# Patient Record
Sex: MALE | Race: WHITE | NOT HISPANIC OR LATINO | URBAN - NONMETROPOLITAN AREA
[De-identification: names, ages, dates, MRNs, and addresses within clinical notes are randomized per-mention and may not be internally consistent; named-entity substitution may affect disease eponyms.]

---

## 2017-08-15 ENCOUNTER — HOSPITAL ENCOUNTER (OUTPATIENT)
Dept: RADIOLOGY | Facility: OTHER | Age: 22
End: 2017-08-15

## 2017-08-15 ENCOUNTER — AMBULATORY - GICH (OUTPATIENT)
Dept: RADIOLOGY | Facility: OTHER | Age: 22
End: 2017-08-15

## 2017-08-15 DIAGNOSIS — M25.461 EFFUSION OF RIGHT KNEE: ICD-10-CM

## 2017-08-15 DIAGNOSIS — M25.561 PAIN IN RIGHT KNEE: ICD-10-CM

## 2017-08-16 ENCOUNTER — HISTORY (OUTPATIENT)
Dept: EMERGENCY MEDICINE | Facility: OTHER | Age: 22
End: 2017-08-16

## 2017-08-17 ENCOUNTER — HISTORY (OUTPATIENT)
Dept: MEDSURG UNIT | Facility: OTHER | Age: 22
End: 2017-08-17

## 2017-08-21 ENCOUNTER — HISTORY (OUTPATIENT)
Dept: MEDSURG UNIT | Facility: OTHER | Age: 22
End: 2017-08-21

## 2017-08-23 ENCOUNTER — AMBULATORY - GICH (OUTPATIENT)
Dept: SCHEDULING | Facility: OTHER | Age: 22
End: 2017-08-23

## 2017-08-23 ENCOUNTER — HOSPITAL ENCOUNTER (INPATIENT)
Facility: CLINIC | Age: 22
LOS: 6 days | Discharge: HOME | End: 2017-08-29
Attending: ORTHOPAEDIC SURGERY | Admitting: ORTHOPAEDIC SURGERY
Payer: MEDICAID

## 2017-08-23 DIAGNOSIS — M25.561 ACUTE PAIN OF RIGHT KNEE: Primary | ICD-10-CM

## 2017-08-23 PROBLEM — C41.9 OSTEOSARCOMA (H): Status: ACTIVE | Noted: 2017-08-23

## 2017-08-23 LAB
CRP SERPL-MCNC: 117 MG/L (ref 0–8)
ERYTHROCYTE [SEDIMENTATION RATE] IN BLOOD BY WESTERGREN METHOD: 77 MM/H (ref 0–15)

## 2017-08-23 PROCEDURE — 25000132 ZZH RX MED GY IP 250 OP 250 PS 637: Performed by: STUDENT IN AN ORGANIZED HEALTH CARE EDUCATION/TRAINING PROGRAM

## 2017-08-23 PROCEDURE — 99207 ZZC MOONLIGHTING INDICATOR: CPT | Performed by: INTERNAL MEDICINE

## 2017-08-23 PROCEDURE — 86140 C-REACTIVE PROTEIN: CPT | Performed by: STUDENT IN AN ORGANIZED HEALTH CARE EDUCATION/TRAINING PROGRAM

## 2017-08-23 PROCEDURE — 36415 COLL VENOUS BLD VENIPUNCTURE: CPT | Performed by: STUDENT IN AN ORGANIZED HEALTH CARE EDUCATION/TRAINING PROGRAM

## 2017-08-23 PROCEDURE — 85652 RBC SED RATE AUTOMATED: CPT | Performed by: STUDENT IN AN ORGANIZED HEALTH CARE EDUCATION/TRAINING PROGRAM

## 2017-08-23 PROCEDURE — 12000001 ZZH R&B MED SURG/OB UMMC

## 2017-08-23 RX ORDER — ACETAMINOPHEN 325 MG/1
650 TABLET ORAL EVERY 4 HOURS PRN
Status: DISCONTINUED | OUTPATIENT
Start: 2017-08-23 | End: 2017-08-29 | Stop reason: HOSPADM

## 2017-08-23 RX ORDER — PROCHLORPERAZINE 25 MG
25 SUPPOSITORY, RECTAL RECTAL EVERY 12 HOURS PRN
Status: DISCONTINUED | OUTPATIENT
Start: 2017-08-23 | End: 2017-08-28

## 2017-08-23 RX ORDER — NALOXONE HYDROCHLORIDE 0.4 MG/ML
.1-.4 INJECTION, SOLUTION INTRAMUSCULAR; INTRAVENOUS; SUBCUTANEOUS
Status: DISCONTINUED | OUTPATIENT
Start: 2017-08-23 | End: 2017-08-29 | Stop reason: HOSPADM

## 2017-08-23 RX ORDER — ONDANSETRON 2 MG/ML
4 INJECTION INTRAMUSCULAR; INTRAVENOUS EVERY 6 HOURS PRN
Status: DISCONTINUED | OUTPATIENT
Start: 2017-08-23 | End: 2017-08-28

## 2017-08-23 RX ORDER — AMOXICILLIN 250 MG
1-2 CAPSULE ORAL 2 TIMES DAILY
Status: DISCONTINUED | OUTPATIENT
Start: 2017-08-23 | End: 2017-08-29 | Stop reason: HOSPADM

## 2017-08-23 RX ORDER — ONDANSETRON 4 MG/1
4 TABLET, ORALLY DISINTEGRATING ORAL EVERY 6 HOURS PRN
Status: DISCONTINUED | OUTPATIENT
Start: 2017-08-23 | End: 2017-08-28

## 2017-08-23 RX ORDER — PROCHLORPERAZINE MALEATE 5 MG
5-10 TABLET ORAL EVERY 6 HOURS PRN
Status: DISCONTINUED | OUTPATIENT
Start: 2017-08-23 | End: 2017-08-28

## 2017-08-23 RX ORDER — OXYCODONE HYDROCHLORIDE 5 MG/1
5 TABLET ORAL EVERY 4 HOURS PRN
Status: DISCONTINUED | OUTPATIENT
Start: 2017-08-23 | End: 2017-08-25

## 2017-08-23 RX ADMIN — ACETAMINOPHEN 650 MG: 325 TABLET, FILM COATED ORAL at 20:42

## 2017-08-23 RX ADMIN — OXYCODONE HYDROCHLORIDE 5 MG: 5 TABLET ORAL at 23:45

## 2017-08-23 ASSESSMENT — ACTIVITIES OF DAILY LIVING (ADL)
COGNITION: 0 - NO COGNITION ISSUES REPORTED
BATHING: 0-->INDEPENDENT
WHICH_OF_THE_ABOVE_FUNCTIONAL_RISKS_HAD_A_RECENT_ONSET_OR_CHANGE?: AMBULATION;TRANSFERRING
RETIRED_COMMUNICATION: 0-->UNDERSTANDS/COMMUNICATES WITHOUT DIFFICULTY
TOILETING: 0-->INDEPENDENT
AMBULATION: 0-->INDEPENDENT
TRANSFERRING: 0-->INDEPENDENT
NUMBER_OF_TIMES_PATIENT_HAS_FALLEN_WITHIN_LAST_SIX_MONTHS: 1
SWALLOWING: 0-->SWALLOWS FOODS/LIQUIDS WITHOUT DIFFICULTY
FALL_HISTORY_WITHIN_LAST_SIX_MONTHS: YES
RETIRED_EATING: 0-->INDEPENDENT
DRESS: 0-->INDEPENDENT

## 2017-08-23 NOTE — H&P
[facility], Orthopaedic Surgery Consultation    Ariel Triplett MRN# [id number]   Age: 22 year old YOB: 1995                       Assessment and Plan:   Assessment:  22-year-old male transferred from outside hospital with MRI concerning for right distal femur osteosarcoma versus osteomyelitis    Plan:  At this time he do not think the patient has a septic right knee based on his physical exam findings and therefore we will not aspirate joint at this time.  1. Obtain base lab work including CBC, BMP, CRP, ESR  2. Uploaded MRI and other images for staff to review  3. Weightbearing as tolerated  4. Normal diet with no plans for operating room currently          History of Present Illness:   Patient was seen and examined by me. History, PMH, Meds, SH, complete ROS (10 organ systems) and PE reviewed with patient and prior medical records.      22-year-old male transferred from [facility] to the [facility] with differential diagnosis of osteomyelitis versus osteosarcoma right distal femur.    Patient reported that right knee and right hip pain that started approximately August 8 when walking home from work. He noticed that the pain continued to linger for the next week making him think that he had perhaps pulled a muscle. Then on August 15 the pain was continuing to increase that he went to a free clinic where an x-ray was obtained. The following day he went to the emergency department with uncontrollable pain. He describes the pain as a deep ache involving his knee and the bones above and below that was made significantly worse with ambulation. In the ED attempted aspiration of the joint was done. However the aspiration yielded less than 1 cc of clear fluid which was insufficient for a cell count. He was initially treated with ceftriaxone and vancomycin for a suspected septic arthritis which resulted in minimal to moderate improvement. During this time he had a waxing and  waning fever as well as downtrending inflammatory markers. Antibiotics were narrowed until he was only on doxycycline. However he continued to have recurrence of fever and the pain continued to be fairly severe. On the 20th and MRI was obtained of the distal femur with an impression including a differential diagnosis of osteomyelitis versus osteosarcoma. At that point the [facility] was contacted for a transfer. However he was unable to come for several days secondary to complications with insurance.    Patient reports ongoing pain in his right knee is roughly unchanged in severity and quality since his initial admission on August 16. He has been ambulating with a walker reports that this is very painful. He continues to have intermittent fevers, aches, and chills. He also reports ongoing pain in his right hip and right ankle. He denies new numbnessm tingling or weakness the distal affected extremity.          Past Medical History:   Depression  Posttraumatic stress disorder   Allergic rhinitis, seasonal          Past Surgical History:   No previous surgeries          Social History:   Currently lives in [place] with a roommate. Walks to work every evening. Smokes 0.25 packs of cigarettes per day.    Social History     Social History     Marital status: N/A     Spouse name: N/A     Number of children: N/A     Years of education: N/A     Social History Main Topics     Smoking status: Not on file     Smokeless tobacco: Not on file     Alcohol use Not on file     Drug use: Not on file     Sexual activity: Not on file     Other Topics Concern     Not on file     Social History Narrative             Family History:   Family history of cancer (unknown what type) in maternal grandmother and grandfather (both were in there 70s- 80s)    Paternal grandfather with congestive heart failure         Medications:   Ceftriaxone and vancomycin (8/16-8/19)  Doxycycline (8/19-8/21)  Ceftriaxone and vancomycin  (8/21-8/23)    Toradol, morphine, and dilaudid attempted during hospitalization for pain control.    No medications taken prior to hospilization          Allergies:    No known drug allergies          Review of Systems:   A comprehensive 10 point review of systems (constitutional, ENT, cardiac, peripheral vascular, respiratory, GI, , Musculoskeletal, skin, Neurological) was performed and found to be negative except as described in this note.           Physical Exam:   COMPLETE EXAMINATION:   VITAL SIGNS: /79 (BP Location: Right arm)  Pulse 72  Temp 97.4  F (36.3  C) (Oral)  SpO2 98%  RESP: Non labored breathing  SKIN: Grossly normal   MUSCULOSKELETAL-LLE: Full range of motion in hip and ankle with mild-to-moderate pain, decreased range of motion and knee joint to 80  of flexion secondary to pain, appreciable knee effusion, increased temperature compared to contralateral knee, tenderness over lateral joint line and into popliteal fossa of knee, mild tenderness to medial joint line, distal extremity neurovascularly intact          Data:     X-ray knee 3 views  (From outside hospital):  No fracture or dislocation is identified, the joint spaces are preserved, no suprapatellar effusion is seen, no foreign body is seen    Ultrasound duplex bilateral lower extremity veins  (From outside hospital-08/17/17):  Small simple appearing fluid collection within the subcutaneous tissues of the lateral knee this corresponds to the patient's area of pain clinically note deep vein thrombosis    MRI knee right without contrast  (From outside hospital-08/21/17):  Marked inflammatory changes of the distal femur are concerning for malignant neoplasm versus infection. There is thinning of the cortex posteriorly with inflammatory changes extending into the femoral attachments about the medial and lateral head of the gastrocnemius. The appearance and location is concerning for osteosarcoma. Lack of IV contrast limits evaluation.  Differential diagnosis also includes the possibility of osteomyelitis.    BREANNE Hou  Orthopedic surgery, PGY-1  [phone]    Attestation:

## 2017-08-23 NOTE — IP AVS SNAPSHOT
MRN:[id number]                      After Visit Summary   8/23/2017    Ariel Triplett    MRN: [id number]           Thank you!     Thank you for choosing [facility] for your care. Our goal is always to provide you with excellent care. Hearing back from our patients is one way we can continue to improve our services. Please take a few minutes to complete the written survey that you may receive in the mail after you visit with us. Thank you!        Patient Information     Date Of Birth          1995        Designated Caregiver       Most Recent Value    Caregiver    Will someone help with your care after discharge? yes    Name of designated caregiver kathie    Phone number of caregiver [phone]    Caregiver [place]      About your hospital stay     You were admitted on:  August 23, 2017 You last received care in the:  [facility]    You were discharged on:  August 29, 2017        Reason for your hospital stay       Right leg pain with concerning MRI images requiring additional workup                  Who to Call     For medical emergencies, please call 911.  For non-urgent questions about your medical care, please call your primary care provider or clinic, [phone]  For questions related to your surgery, please call your surgery clinic        Attending Provider     Provider Cesar Ramirez MD Orthopedics    [place]Ne[place] cordero MD Orthopaedic Surgery       Primary Care Provider Office Phone # Fax #    Anton Abad [phone]       When to contact your care team       CALL YOUR PHYSICIAN IF:  1.  Your pain begins to worsen and is unable to be controlled with your medications.  2.  You have a temperature elevation greater than 101.5    3.  You have increased pain, swelling or redness in your knee or if you develop numbness or weakness in your leg or foot.    During regular business hours call the [facility] at [phone] and ask for the triage nurse. After  hours or weekends call the hospital  at [phone] and ask for the ortho resident on call.                  After Care Instructions     Activity       Your activity upon discharge: Activity as tolerated. Use the crutches to minimize weight bearing on the affected extremity.  You may remove your dressings 3 days after surgery, leave steri strips in place though and allow to fall off on their own. May shower at that time. No bathing or immersion in water x 2 weeks.            Diet       Follow this diet upon discharge: Normal diet. You should not eat or drink anything by mouth starting at 12:00am the night before your surgery.                  Follow-up Appointments     Adult [facility] Follow-up and recommended labs and tests       We will call you when the results of your biopsy have returned, and we will coordinate your follow up visit at that time based on the results.    Appointments on [place] and/or [facility] (with [facility] or [facility] provider or service). Call [phone]. To call the hospital and speak to the  please call [phone].                  Pending Results     Date and Time Order Name Status Description    8/29/2017 1452 XR Femur Right 2 Views In process     8/28/2017 1812 Surgical pathology exam In process     8/28/2017 1811 Tissue Culture Aerobic Bacterial Preliminary     8/28/2017 1811 Anaerobic bacterial culture Preliminary     8/28/2017 1811 Leukemia Lymphoma Evaluation (Flow Cytometry) In process             Statement of Approval     Ordered          08/29/17 3598  I have reviewed and agree with all the recommendations and orders detailed in this document.  EFFECTIVE NOW     Approved and electronically signed by:  Mi Mendez MD             Admission Information     Date & Time Provider Department Dept. Phone    8/23/2017 Neymar Landers MD [facility] [phone]      Your Vitals Were     Blood Pressure Pulse Temperature Respirations Height Weight    117/74  "82 98  F (36.7  C) 16 1.753 m (5' 9\") 84.3 kg (185 lb 14.4 oz)    Pulse Oximetry BMI (Body Mass Index)                93% 27.45 kg/m2          [employer] Information     [employer] lets you send messages to your doctor, view your test results, renew your prescriptions, schedule appointments and more. To sign up, go to [email or web address] . Click on \"Log in\" on the left side of the screen, which will take you to the Welcome page. Then click on \"Sign up Now\" on the right side of the page.     You will be asked to enter the access code listed below, as well as some personal information. Please follow the directions to create your username and password.     Your access code is: [email or web address]  Expires: 2017  5:09 PM     Your access code will  in 90 days. If you need help or a new code, please call your [facility] clinic or [phone].        Care EveryWhere ID     This is your Care EveryWhere ID. This could be used by other organizations to access your [facility] medical records  [id number]        Equal Access to Services     ANDRE RAVI AH: Hadii marlys Luque, wadavidda yoly, qajosé miguelta kaalmada david, sandip loredo. [facility] [phone].    ATENCIÓN: Si habla español, tiene a brown disposición servicios gratuitos de asistencia lingüística. Patti al [phone].    We comply with applicable federal civil rights laws and [place] laws. We do not discriminate on the basis of race, color, national origin, age, disability sex, sexual orientation or gender identity.               Review of your medicines      START taking        Dose / Directions    acetaminophen 325 MG tablet   Commonly known as:  TYLENOL   Used for:  Acute pain of right knee        Dose:  650 mg   Take 2 tablets (650 mg) by mouth every 4 hours as needed for mild pain   Quantity:  30 tablet   Refills:  0       gabapentin 100 MG capsule   Commonly known as:  NEURONTIN   Used for:  Acute pain of right knee        " Dose:  100 mg   Take 1 capsule (100 mg) by mouth 3 times daily   Quantity:  90 capsule   Refills:  1       HYDROmorphone 2 MG tablet   Commonly known as:  DILAUDID   Used for:  Acute pain of right knee        Dose:  2-4 mg   Take 1-2 tablets (2-4 mg) by mouth every 4 hours as needed for moderate to severe pain   Quantity:  80 tablet   Refills:  0       hydrOXYzine 25 MG tablet   Commonly known as:  ATARAX   Used for:  Acute pain of right knee        Dose:  25 mg   Take 1 tablet (25 mg) by mouth every 6 hours as needed for other (adjuvant pain)   Quantity:  60 tablet   Refills:  1       ibuprofen 600 MG tablet   Commonly known as:  ADVIL/MOTRIN   Used for:  Acute pain of right knee        Dose:  600 mg   Take 1 tablet (600 mg) by mouth every 6 hours for 7 days   Quantity:  28 tablet   Refills:  0       oxyCODONE 5 MG IR tablet   Commonly known as:  ROXICODONE   Used for:  Acute pain of right knee        Dose:  5-10 mg   Take 1-2 tablets (5-10 mg) by mouth every 4 hours as needed for moderate to severe pain   Quantity:  80 tablet   Refills:  0       senna-docusate 8.6-50 MG per tablet   Commonly known as:  SENOKOT-S;PERICOLACE   Used for:  Acute pain of right knee        Dose:  1-2 tablet   Take 1-2 tablets by mouth 2 times daily   Quantity:  30 tablet   Refills:  0            Where to get your medicines      These medications were sent to [facility] [address]  [address] [address]     Phone:  [phone]     acetaminophen 325 MG tablet    gabapentin 100 MG capsule    hydrOXYzine 25 MG tablet    ibuprofen 600 MG tablet    senna-docusate 8.6-50 MG per tablet         Some of these will need a paper prescription and others can be bought over the counter. Ask your nurse if you have questions.     Bring a paper prescription for each of these medications     HYDROmorphone 2 MG tablet    oxyCODONE 5 MG IR tablet                Protect others around you: Learn  how to safely use, store and throw away your medicines at www.disposemymeds.org.             Medication List: This is a list of all your medications and when to take them. Check marks below indicate your daily home schedule. Keep this list as a reference.      Medications           Morning Afternoon Evening Bedtime As Needed    acetaminophen 325 MG tablet   Commonly known as:  TYLENOL   Take 2 tablets (650 mg) by mouth every 4 hours as needed for mild pain   Last time this was given:  650 mg on 8/29/2017  4:54 PM                                gabapentin 100 MG capsule   Commonly known as:  NEURONTIN   Take 1 capsule (100 mg) by mouth 3 times daily   Last time this was given:  100 mg on 8/29/2017  2:29 PM                                HYDROmorphone 2 MG tablet   Commonly known as:  DILAUDID   Take 1-2 tablets (2-4 mg) by mouth every 4 hours as needed for moderate to severe pain   Last time this was given:  4 mg on 8/29/2017  2:29 PM                                hydrOXYzine 25 MG tablet   Commonly known as:  ATARAX   Take 1 tablet (25 mg) by mouth every 6 hours as needed for other (adjuvant pain)   Last time this was given:  25 mg on 8/29/2017  4:54 PM                                ibuprofen 600 MG tablet   Commonly known as:  ADVIL/MOTRIN   Take 1 tablet (600 mg) by mouth every 6 hours for 7 days   Last time this was given:  600 mg on 8/29/2017  2:29 PM                                oxyCODONE 5 MG IR tablet   Commonly known as:  ROXICODONE   Take 1-2 tablets (5-10 mg) by mouth every 4 hours as needed for moderate to severe pain   Last time this was given:  10 mg on 8/29/2017 12:41 PM                                senna-docusate 8.6-50 MG per tablet   Commonly known as:  SENOKOT-S;PERICOLACE   Take 1-2 tablets by mouth 2 times daily

## 2017-08-23 NOTE — PROGRESS NOTES
Admission:  Pt arrived via EMT from [facility] at 1720.Pt coming in with c/o R-knee pain.Pt arrived with personal belongings.  Pt settled into room,oriented to call light and unit routine.Ortho on call informed of pt's arrival.Pt received Fentanyl IV  En route so pain comfortably manageable currently.Ortho resident here to see patient.will wait for POC.

## 2017-08-23 NOTE — IP AVS SNAPSHOT
[facility]    [address]    [place] [address]    Phone:  [phone]                                       After Visit Summary   8/23/2017    Ariel Triplett    MRN: [id number]           After Visit Summary Signature Page     I have received my discharge instructions, and my questions have been answered. I have discussed any challenges I see with this plan with the nurse or doctor.    ..........................................................................................................................................  Patient/Patient Representative Signature      ..........................................................................................................................................  Patient Representative Print Name and Relationship to Patient    ..................................................               ................................................  Date                                            Time    ..........................................................................................................................................  Reviewed by Signature/Title    ...................................................              ..............................................  Date                                                            Time

## 2017-08-24 ENCOUNTER — APPOINTMENT (OUTPATIENT)
Dept: MRI IMAGING | Facility: CLINIC | Age: 22
End: 2017-08-24
Attending: ORTHOPAEDIC SURGERY
Payer: MEDICAID

## 2017-08-24 ENCOUNTER — APPOINTMENT (OUTPATIENT)
Dept: GENERAL RADIOLOGY | Facility: CLINIC | Age: 22
End: 2017-08-24
Attending: ORTHOPAEDIC SURGERY
Payer: MEDICAID

## 2017-08-24 LAB
ALBUMIN SERPL-MCNC: 2.8 G/DL (ref 3.4–5)
ALP SERPL-CCNC: 142 U/L (ref 40–150)
ALT SERPL W P-5'-P-CCNC: 90 U/L (ref 0–70)
ANION GAP SERPL CALCULATED.3IONS-SCNC: 8 MMOL/L (ref 3–14)
APTT PPP: 35 SEC (ref 22–37)
AST SERPL W P-5'-P-CCNC: 35 U/L (ref 0–45)
BILIRUB SERPL-MCNC: 0.4 MG/DL (ref 0.2–1.3)
BUN SERPL-MCNC: 12 MG/DL (ref 7–30)
CALCIUM SERPL-MCNC: 9.3 MG/DL (ref 8.5–10.1)
CHLORIDE SERPL-SCNC: 104 MMOL/L (ref 94–109)
CO2 SERPL-SCNC: 29 MMOL/L (ref 20–32)
CREAT SERPL-MCNC: 0.8 MG/DL (ref 0.66–1.25)
ERYTHROCYTE [DISTWIDTH] IN BLOOD BY AUTOMATED COUNT: 12.1 % (ref 10–15)
GFR SERPL CREATININE-BSD FRML MDRD: >90 ML/MIN/1.7M2
GLUCOSE SERPL-MCNC: 90 MG/DL (ref 70–99)
HCT VFR BLD AUTO: 41.3 % (ref 40–53)
HGB BLD-MCNC: 13.9 G/DL (ref 13.3–17.7)
INR PPP: 1.04 (ref 0.86–1.14)
MCH RBC QN AUTO: 30 PG (ref 26.5–33)
MCHC RBC AUTO-ENTMCNC: 33.7 G/DL (ref 31.5–36.5)
MCV RBC AUTO: 89 FL (ref 78–100)
PLATELET # BLD AUTO: 389 10E9/L (ref 150–450)
POTASSIUM SERPL-SCNC: 4.2 MMOL/L (ref 3.4–5.3)
PROT SERPL-MCNC: 7.1 G/DL (ref 6.8–8.8)
RBC # BLD AUTO: 4.64 10E12/L (ref 4.4–5.9)
SODIUM SERPL-SCNC: 141 MMOL/L (ref 133–144)
WBC # BLD AUTO: 6.1 10E9/L (ref 4–11)

## 2017-08-24 PROCEDURE — 25000128 H RX IP 250 OP 636: Performed by: ORTHOPAEDIC SURGERY

## 2017-08-24 PROCEDURE — 99233 SBSQ HOSP IP/OBS HIGH 50: CPT | Performed by: INTERNAL MEDICINE

## 2017-08-24 PROCEDURE — 36415 COLL VENOUS BLD VENIPUNCTURE: CPT | Performed by: STUDENT IN AN ORGANIZED HEALTH CARE EDUCATION/TRAINING PROGRAM

## 2017-08-24 PROCEDURE — A9585 GADOBUTROL INJECTION: HCPCS | Performed by: ORTHOPAEDIC SURGERY

## 2017-08-24 PROCEDURE — 99207 ZZC CDG-MDM COMPONENT: MEETS MODERATE - UP CODED: CPT | Performed by: INTERNAL MEDICINE

## 2017-08-24 PROCEDURE — 85610 PROTHROMBIN TIME: CPT | Performed by: STUDENT IN AN ORGANIZED HEALTH CARE EDUCATION/TRAINING PROGRAM

## 2017-08-24 PROCEDURE — 40000135 MR MHEALTH OVERREAD

## 2017-08-24 PROCEDURE — 85730 THROMBOPLASTIN TIME PARTIAL: CPT | Performed by: STUDENT IN AN ORGANIZED HEALTH CARE EDUCATION/TRAINING PROGRAM

## 2017-08-24 PROCEDURE — 25000132 ZZH RX MED GY IP 250 OP 250 PS 637: Performed by: STUDENT IN AN ORGANIZED HEALTH CARE EDUCATION/TRAINING PROGRAM

## 2017-08-24 PROCEDURE — 85027 COMPLETE CBC AUTOMATED: CPT | Performed by: ORTHOPAEDIC SURGERY

## 2017-08-24 PROCEDURE — 73720 MRI LWR EXTREMITY W/O&W/DYE: CPT | Mod: RT

## 2017-08-24 PROCEDURE — 80053 COMPREHEN METABOLIC PANEL: CPT | Performed by: STUDENT IN AN ORGANIZED HEALTH CARE EDUCATION/TRAINING PROGRAM

## 2017-08-24 PROCEDURE — 12000001 ZZH R&B MED SURG/OB UMMC

## 2017-08-24 RX ORDER — GADOBUTROL 604.72 MG/ML
10 INJECTION INTRAVENOUS ONCE
Status: COMPLETED | OUTPATIENT
Start: 2017-08-24 | End: 2017-08-24

## 2017-08-24 RX ADMIN — OXYCODONE HYDROCHLORIDE 5 MG: 5 TABLET ORAL at 12:00

## 2017-08-24 RX ADMIN — OXYCODONE HYDROCHLORIDE 5 MG: 5 TABLET ORAL at 16:30

## 2017-08-24 RX ADMIN — ACETAMINOPHEN 650 MG: 325 TABLET, FILM COATED ORAL at 20:39

## 2017-08-24 RX ADMIN — OXYCODONE HYDROCHLORIDE 5 MG: 5 TABLET ORAL at 20:39

## 2017-08-24 RX ADMIN — OXYCODONE HYDROCHLORIDE 5 MG: 5 TABLET ORAL at 07:36

## 2017-08-24 RX ADMIN — GADOBUTROL 8.2 ML: 604.72 INJECTION INTRAVENOUS at 19:05

## 2017-08-24 RX ADMIN — ACETAMINOPHEN 650 MG: 325 TABLET, FILM COATED ORAL at 12:00

## 2017-08-24 NOTE — CONSULTS
Internal Medicine Consultation  [facility]  Hospital Medicine Service    Ariel Triplett MRN# [id number]   YOB: 1995 Age: 22 year old      Date of Admission: 8/23/2017    Primary care provider: Anton Abad    Requesting Provider : Keshawn Hou MD    Reason for Consultation : Evaluation, recommendations and co management of medical co morbidities.          Chief Complaint:      R knee pain, swelling.       History of Present Illness:     History is obtained from the patient and medical record.      Ariel Triplett is a 22 year old male transferred from [facility] to the [facility] with differential diagnosis of osteomyelitis versus osteosarcoma right distal femur.         Patient reported that right knee and right hip pain that started approximately August 8 when walking home from work. He noticed that the pain continued to linger for the next week making him think that he had perhaps pulled a muscle. Then on August 15 the pain was continuing to increase that he went to a free clinic where an x-ray was obtained. The following day he went to the emergency department with uncontrollable pain. He describes the pain as a deep ache involving his knee and the bones above and below that was made significantly worse with ambulation. In the ED attempted aspiration of the joint was done. However the aspiration yielded less than 1 cc of clear fluid which was insufficient for a cell count. He was initially treated with ceftriaxone and vancomycin for a suspected septic arthritis which resulted in minimal to moderate improvement. During this time he had a waxing and waning fever as well as downtrending inflammatory markers. Antibiotics were narrowed until he was only on doxycycline. However he continued to have recurrence of fever and the pain continued to be fairly severe. On the 20th and MRI was obtained of the distal femur with an impression including a  differential diagnosis of osteomyelitis versus osteosarcoma. At that point the [facility] was contacted for a transfer. However he was unable to come for several days secondary to complications with insurance.     Patient                Past Medical History:     Depression  Posttraumatic stress disorder   Allergic rhinitis, seasonal       Past Surgical History:     Reviewed.   No pertinent hx reported.           Social History:   Currently lives in [place] with a roommate. Walks to work every evening. Smokes 0.25 packs of cigarettes per day.         Family History:   Reviewed.   Family history of cancer (unknown what type) in maternal grandmother and grandfather (both were in there 70s- 80s)  Paternal grandfather with congestive heart failure          Immunizations:     There is no immunization history on file for this patient.         Allergies:   Allergies not on file          Medications:     Prior to Admission medications    Not on File   No home medications.      Current Facility-Administered Medications   Medication     naloxone (NARCAN) injection 0.1-0.4 mg     oxyCODONE (ROXICODONE) IR tablet 5 mg     acetaminophen (TYLENOL) tablet 650 mg     senna-docusate (SENOKOT-S;PERICOLACE) 8.6-50 MG per tablet 1-2 tablet     ondansetron (ZOFRAN-ODT) ODT tab 4 mg    Or     ondansetron (ZOFRAN) injection 4 mg     prochlorperazine (COMPAZINE) injection 5-10 mg    Or     prochlorperazine (COMPAZINE) tablet 5-10 mg    Or     prochlorperazine (COMPAZINE) Suppository 25 mg            Review of Systems:   The 10 point Review of Systems is negative other than noted in the HPI           Physical Exam:       Blood pressure 136/74, pulse 72, temperature 97.9  F (36.6  C), temperature source Oral, resp. rate 16, SpO2 96 %.    Temp (24hrs), Av.9  F (36.6  C), Min:97.4  F (36.3  C), Max:98.3  F (36.8  C)    No intake or output data in the 24 hours ending 17 0140    General: Alert, interactive, NAD  HEENT:  AT/NC, anicteric, PERRL, Moist MM  Neck: Supple, no JVD or cervical LAD  Chest/Resp: Clear to auscultation bilaterally, no crackles or wheezes  Heart/CV: S1 S2 regular, no murmur. No pedal edema.   Abdomen/ GI: Soft, nontender, nondistended. +BS.  No rebound or guarding.  Extremities/MSK: R knee area: swollen, tender, warm room. distal pulses 2+ and warm.   Skin: Warm and dry, no jaundice or new rash on exposed areas  Neuro: Alert & oriented x 3, Cns 2-12 grossly intact  Psych: Pleasant.           Data:     All laboratory data reviewed    LABS (Last four results)  CMPNo lab results found in last 7 days.  CBCNo lab results found in last 7 days.  INRNo lab results found in last 7 days.  Arterial Blood GasNo lab results found in last 7 days.    No results found for this or any previous visit (from the past 48 hour(s)).         Assessment and Recommendations:     22-year-old male transferred from outside hospital with MRI concerning for right distal femur osteosarcoma versus osteomyelitis.     # Medical: Denies any significant PMH or PSH. Not on any medications at home.  - Monitor vitals per unit routine  - routine labs: cbc, cmp. coags (if procedure), sed rate, crp.   - Encourage IS  - bowel regimen with narcotics.     # R distal femur lesion:   - mx per Ortho including pain control. dvt ppx. Activity.   - Surgical plan for ortho.     FEN: regular diet.   dvt ppx: per ortho  Full code.       Thank you for this interesting consultation.  We are glad to follow along with you.      Kevin Vera MD  House Physician  Pager: [phone]    8/23/2017

## 2017-08-24 NOTE — PROGRESS NOTES
No surgery will be done tonight. Ok to eat now.    Will obtain MRI entire R femur this evening to fully understand extent of lesion.    Discussed with Dr Chano Mayfield MD  Orthopaedic Surgery PGY-4  Pager:  [phone]

## 2017-08-24 NOTE — PLAN OF CARE
Problem: Perioperative Period (Adult)  Goal: Signs and Symptoms of Listed Potential Problems Will be Absent or Manageable (Perioperative Period)  Signs and symptoms of listed potential problems will be absent or manageable by discharge/transition of care (reference Perioperative Period (Adult) CPG).   Outcome: No Change  Pt A/O X 4. Afebrile. VSS. Lungs- clear bilaterally with both anterior and posterior. IS encouraged. Bowels- active in all four quadrants. CMS and Neuro's are intact. Denies numbness and tingling in all extremities. Pedal pulses present in R foot. Denies nausea, shortness of breath, and chest pain. Has pain in the RLE starting at the knee and down to the ankle. Was given oxycodone and tylenol, ICE applied, and is tolerating well. Voids spontaneously without difficulty in the toilet. Is on a regular diet and appetite was well this shift. Changed to NPO at 1600 for MRI around 1910. Pt up stand by assist using a walking, limited weight bearing on R foot due to pain. Independent with self care. PIV is patent in the Left AC and is patent. Pt independent with positioning and R foot elevated off of bed. Pt is able to make needs known and the call light is within the pt's reach. Continue to monitor.

## 2017-08-24 NOTE — PROGRESS NOTES
"[facility] Internal Medicine Progress Note            Interval History:   Record reviewed including IM consult.  Seen with RN.  RLE pain with MR imaging raising concern regarding osteomyelitis vs osteosarcoma.  Transferred from [facility] where admitted with fever and RLE pain.  Concern apparently regarding septic arthritis.  Joint aspirate apparently non diagnostic.  IV vanco and ceftriaxone with MR imaging 8/20 prompting transfer to this facility. Surgical biopsy planned pending f/u MR imaging ordered by ortho.    PMH:  Reviewed.  No other known serious illness.    ROS:  No CP, SOB, appreciable cough, nausea, reflux, abd pain.  Formed BM.  No voiding c/o's  No prior anesthesia, no abnormal bleeding, clots, transfusion, steroids, ongoing URI symptoms.           Medications:   All medications reviewed today          Physical Exam:   Blood pressure 133/79, pulse 75, temperature 97.8  F (36.6  C), temperature source Oral, resp. rate 17, height 1.753 m (5' 9\"), weight 82.3 kg (181 lb 6.4 oz), SpO2 96 %.  No intake or output data in the 24 hours ending 08/24/17 1722    General:  Alert.  Appropriate.  No distress.     Heent:      Neck:    Skin:    Chest:  clear    Cardiac:  reg without gallop, murmur.     Abdomen:  Non distended, soft, non tender.  BS normal.     Extremities:  No edema.  R knee effusion per ortho with tenderness extending into popliteal fossa.     Neuro:            Data:     Results for orders placed or performed during the hospital encounter of 08/23/17 (from the past 24 hour(s))   Internal Medicine Adult IP Consult for [facility]: Patient to be seen: Routine within 24 hrs; Call back #: [phone]; Medical co-managment; Consultant may enter orders: Yes    Narrative    Kevin Vera MD     8/24/2017  1:59 AM    Internal Medicine Consultation  [facility]  Hospital Medicine Service    Ariel Triplett MRN# [id number]   YOB: 1995 Age: 22 " year old      Date of Admission: 8/23/2017    Primary care provider: Anton Abad    Requesting Provider : Keshawn Hou MD    Reason for Consultation : Evaluation, recommendations and co   management of medical co morbidities.          Chief Complaint:      R knee pain, swelling.       History of Present Illness:     History is obtained from the patient and medical record.      Ariel Triplett is a 22 year old male transferred from [facility] to the [facility] with differential   diagnosis of osteomyelitis versus osteosarcoma right distal   femur.         Patient reported that right knee and right hip pain that started   approximately August 8 when walking home from work. He noticed   that the pain continued to linger for the next week making him   think that he had perhaps pulled a muscle. Then on August 15 the   pain was continuing to increase that he went to a free clinic   where an x-ray was obtained. The following day he went to the   emergency department with uncontrollable pain. He describes the   pain as a deep ache involving his knee and the bones above and   below that was made significantly worse with ambulation. In the   ED attempted aspiration of the joint was done. However the   aspiration yielded less than 1 cc of clear fluid which was   insufficient for a cell count. He was initially treated with   ceftriaxone and vancomycin for a suspected septic arthritis which   resulted in minimal to moderate improvement. During this time he   had a waxing and waning fever as well as downtrending   inflammatory markers. Antibiotics were narrowed until he was only   on doxycycline. However he continued to have recurrence of fever   and the pain continued to be fairly severe. On the 20th and MRI   was obtained of the distal femur with an impression including a   differential diagnosis of osteomyelitis versus osteosarcoma. At   that point the [facility] was contacted for  a   transfer. However he was unable to come for several days   secondary to complications with insurance.     Patient                Past Medical History:     Depression  Posttraumatic stress disorder   Allergic rhinitis, seasonal       Past Surgical History:     Reviewed.   No pertinent hx reported.           Social History:   Currently lives in [place] with a roommate. Walks to work   every evening. Smokes 0.25 packs of cigarettes per day.         Family History:   Reviewed.   Family history of cancer (unknown what type) in maternal   grandmother and grandfather (both were in there 70s- 80s)  Paternal grandfather with congestive heart failure          Immunizations:     There is no immunization history on file for this patient.         Allergies:   Allergies not on file          Medications:     Prior to Admission medications    Not on File   No home medications.      Current Facility-Administered Medications   Medication     naloxone (NARCAN) injection 0.1-0.4 mg     oxyCODONE (ROXICODONE) IR tablet 5 mg     acetaminophen (TYLENOL) tablet 650 mg     senna-docusate (SENOKOT-S;PERICOLACE) 8.6-50 MG per tablet 1-2   tablet     ondansetron (ZOFRAN-ODT) ODT tab 4 mg    Or     ondansetron (ZOFRAN) injection 4 mg     prochlorperazine (COMPAZINE) injection 5-10 mg    Or     prochlorperazine (COMPAZINE) tablet 5-10 mg    Or     prochlorperazine (COMPAZINE) Suppository 25 mg            Review of Systems:   The 10 point Review of Systems is negative other than noted in   the HPI           Physical Exam:       Blood pressure 136/74, pulse 72, temperature 97.9  F (36.6  C),   temperature source Oral, resp. rate 16, SpO2 96 %.    Temp (24hrs), Av.9  F (36.6  C), Min:97.4  F (36.3  C),   Max:98.3  F (36.8  C)    No intake or output data in the 24 hours ending 17 0140    General: Alert, interactive, NAD  HEENT: AT/NC, anicteric, PERRL, Moist MM  Neck: Supple, no JVD or cervical LAD  Chest/Resp: Clear to  auscultation bilaterally, no crackles or   wheezes  Heart/CV: S1 S2 regular, no murmur. No pedal edema.   Abdomen/ GI: Soft, nontender, nondistended. +BS.  No rebound or   guarding.  Extremities/MSK: R knee area: swollen, tender, warm room. distal   pulses 2+ and warm.   Skin: Warm and dry, no jaundice or new rash on exposed areas  Neuro: Alert & oriented x 3, Cns 2-12 grossly intact  Psych: Pleasant.           Data:     All laboratory data reviewed    LABS (Last four results)  CMPNo lab results found in last 7 days.  CBCNo lab results found in last 7 days.  INRNo lab results found in last 7 days.  Arterial Blood GasNo lab results found in last 7 days.    No results found for this or any previous visit (from the past 48   hour(s)).         Assessment and Recommendations:     22-year-old male transferred from outside hospital with MRI   concerning for right distal femur osteosarcoma versus   osteomyelitis.     # Medical: Denies any significant PMH or PSH. Not on any   medications at home.  - Monitor vitals per unit routine  - routine labs: cbc, cmp. coags (if procedure), sed rate, crp.   - Encourage IS  - bowel regimen with narcotics.     # R distal femur lesion:   - mx per Ortho including pain control. dvt ppx. Activity.   - Surgical plan for ortho.     FEN: regular diet.   dvt ppx: per ortho  Full code.       Thank you for this interesting consultation.  We are glad to   follow along with you.      Kevin Vera MD  House Physician  Pager: [phone]    8/23/2017                CRP inflammation   Result Value Ref Range    CRP Inflammation 117.0 (H) 0.0 - 8.0 mg/L   Erythrocyte sedimentation rate auto   Result Value Ref Range    Sed Rate 77 (H) 0 - 15 mm/h   Comprehensive metabolic panel   Result Value Ref Range    Sodium 141 133 - 144 mmol/L    Potassium 4.2 3.4 - 5.3 mmol/L    Chloride 104 94 - 109 mmol/L    Carbon Dioxide 29 20 - 32 mmol/L    Anion Gap 8 3 - 14 mmol/L    Glucose 90 70 - 99 mg/dL    Urea  Nitrogen 12 7 - 30 mg/dL    Creatinine 0.80 0.66 - 1.25 mg/dL    GFR Estimate >90 >60 mL/min/1.7m2    GFR Estimate If Black >90 >60 mL/min/1.7m2    Calcium 9.3 8.5 - 10.1 mg/dL    Bilirubin Total 0.4 0.2 - 1.3 mg/dL    Albumin 2.8 (L) 3.4 - 5.0 g/dL    Protein Total 7.1 6.8 - 8.8 g/dL    Alkaline Phosphatase 142 40 - 150 U/L    ALT 90 (H) 0 - 70 U/L    AST 35 0 - 45 U/L   INR   Result Value Ref Range    INR 1.04 0.86 - 1.14   Partial thromboplastin time   Result Value Ref Range    PTT 35 22 - 37 sec   CBC with platelets   Result Value Ref Range    WBC 6.1 4.0 - 11.0 10e9/L    RBC Count 4.64 4.4 - 5.9 10e12/L    Hemoglobin 13.9 13.3 - 17.7 g/dL    Hematocrit 41.3 40.0 - 53.0 %    MCV 89 78 - 100 fl    MCH 30.0 26.5 - 33.0 pg    MCHC 33.7 31.5 - 36.5 g/dL    RDW 12.1 10.0 - 15.0 %    Platelet Count 389 150 - 450 10e9/L   MR Suzette Rivera    Narrative    EXAMINATION: MR SUZETTE RIVERA, 8/24/2017 8:19 AM    TECHNIQUE: Outside films dated 8/21/2017 were submitted for  interpretation. Multisequence multiplanar MR images of the right knee  were performed without contrast.    COMPARISON: Outside radiographs 8/15/2017    PREVIOUS REPORT: The original interpretation was available for review  at the time of this dictation.     HISTORY: Right knee pain, osteomyelitis versus malignancy    FINDINGS:   This report is designed to answer a focused clinical  question and should be interpreted in conjunction with the original  report.     MENISCI:  Medial meniscus: Intact.  Lateral meniscus: Intact.    LIGAMENTS  Cruciate ligaments: Intact.  Medial supporting structures: Intact.  Lateral supporting structures: Intact.    EXTENSOR MECHANISM  Intact.    FLUID  Small joint effusion.    OSSEOUS and ARTICULAR STRUCTURES  Bones: Lobular areas abnormal T2 hyperintense, PD hypointense signal  centered in the lateral distal femoral metaphysis and crossing the  physeal scar into the distal lateral femoral epiphysis. There is  associated  periosteal edema best visualized on series 7 image 10. T2  hyperintense signal extends into the prefemoral fat and proximal  attachments of the medial and lateral heads of the gastrocnemius. No  evidence of new bone formation in the adjacent soft tissues although  evaluation suboptimal without contrast for T1-weighted imaging.    Patellofemoral compartment: No hyaline cartilage disease.    Medial compartment: No hyaline cartilage disease.    Lateral compartment: No hyaline cartilage disease.    ANCILLARY FINDINGS  None.      Impression    Impression:  1. Abnormal areas of lobular T2 hyperintense, PD hypointense signal  centered in the distal lateral femoral metaphysis and crossing the  physeal scar into the epiphysis. Examination suboptimal without  T1-weighted imaging or IV contrast. There is periosteal reaction and  edema along the distal posterior femur on sagittal image 9. Appearance  nonspecific and considerations include osteomyelitis, Aquino's,  lymphoma, or Langerhans histiocytosis. Biopsy planned, per chart.  2. Consider evaluation for multifocality with tagged WBC scan and/or  MDP bone scan versus PET/CT.    I have personally reviewed the examination and initial interpretation  and I agree with the findings.    JUTTA ELLERMANN, MD                Assessment and Plan:   1)  RLE pain, fever, etiology unclear.  Differential including osteomyelitis vs neoplasm.  2)  Depression/PTSD, compensated.  3)  Seasonal allergies, quiescent.   4)  General good health.    PLAN:  1)  Biopsy planned by ortho - procedure low risk.  No c/o's or findings to contra indicate.  2)  Trend labs.  3)  Monitor clinically.  4)  DVT prophylaxis per ortho intervention determined.   Disposition Plan   Expected discharge undetermined pending confirmation of diagnosis and treatment intervention.      Entered: Keshawn Weiss 08/24/2017, 5:22 PM              Attestation:  I have reviewed today's vital signs, notes, medications, labs and  imaging.     Keshawn Weiss MD

## 2017-08-24 NOTE — PLAN OF CARE
Problem: Goal Outcome Summary  Goal: Goal Outcome Summary  Outcome: No Change  Patient is AxO. VSS. Afebrile. Patient denies nay chest pain, SOB, numbness or tingling. CMS and neuros intact. Requesting tylenol and oxycodone for pain in the right knee. There is some trace edema in RLE. No redness noted. Patient reports BM today, refusing stool softner. Voiding without difficulty by ambulating to the bathroom with standby assist and a walker. Patient was able to use call light to make needs known and call light remained within reach for the duration of the shift.

## 2017-08-24 NOTE — PROGRESS NOTES
Per update from Dr Leon, pt needs to be scheduled for distal femur biopsy details as to whom will do it in process.  Pt updated.

## 2017-08-24 NOTE — PLAN OF CARE
Problem: Goal Outcome Summary  Goal: Goal Outcome Summary  Outcome: No Change  Patient aXo. Tolerating regular diet. Reporting pain in the right knee managed with prn oxycodone and ice. Denies any chest pain, sob, numbness or tingling.Voiding without difficulty by ambulating to the bathroom with walker and stand-by assist. Refusing stool softners. Patient was able to use call light to make needs known and call light remained within reach for the duration of the shift. Continue POC.

## 2017-08-25 ENCOUNTER — APPOINTMENT (OUTPATIENT)
Dept: PHYSICAL THERAPY | Facility: CLINIC | Age: 22
End: 2017-08-25
Attending: ORTHOPAEDIC SURGERY
Payer: MEDICAID

## 2017-08-25 ENCOUNTER — AMBULATORY - GICH (OUTPATIENT)
Dept: SCHEDULING | Facility: OTHER | Age: 22
End: 2017-08-25

## 2017-08-25 PROCEDURE — 25000128 H RX IP 250 OP 636: Performed by: NURSE PRACTITIONER

## 2017-08-25 PROCEDURE — 97116 GAIT TRAINING THERAPY: CPT | Mod: GP | Performed by: PHYSICAL THERAPIST

## 2017-08-25 PROCEDURE — 25000132 ZZH RX MED GY IP 250 OP 250 PS 637: Performed by: INTERNAL MEDICINE

## 2017-08-25 PROCEDURE — 25000132 ZZH RX MED GY IP 250 OP 250 PS 637: Performed by: STUDENT IN AN ORGANIZED HEALTH CARE EDUCATION/TRAINING PROGRAM

## 2017-08-25 PROCEDURE — 40000193 ZZH STATISTIC PT WARD VISIT: Performed by: PHYSICAL THERAPIST

## 2017-08-25 PROCEDURE — 97161 PT EVAL LOW COMPLEX 20 MIN: CPT | Mod: GP | Performed by: PHYSICAL THERAPIST

## 2017-08-25 PROCEDURE — 12000001 ZZH R&B MED SURG/OB UMMC

## 2017-08-25 PROCEDURE — 99232 SBSQ HOSP IP/OBS MODERATE 35: CPT | Performed by: INTERNAL MEDICINE

## 2017-08-25 PROCEDURE — 99207 ZZC CDG-MDM COMPONENT: MEETS MODERATE - UP CODED: CPT | Performed by: INTERNAL MEDICINE

## 2017-08-25 RX ORDER — OXYCODONE HYDROCHLORIDE 5 MG/1
5-10 TABLET ORAL EVERY 4 HOURS PRN
Status: DISCONTINUED | OUTPATIENT
Start: 2017-08-25 | End: 2017-08-29

## 2017-08-25 RX ORDER — ACETAMINOPHEN 325 MG/1
650 TABLET ORAL EVERY 4 HOURS PRN
Qty: 30 TABLET | Refills: 0 | Status: SHIPPED | OUTPATIENT
Start: 2017-08-25

## 2017-08-25 RX ORDER — OXYCODONE HYDROCHLORIDE 5 MG/1
5 TABLET ORAL EVERY 4 HOURS PRN
Qty: 20 TABLET | Refills: 0 | Status: SHIPPED | OUTPATIENT
Start: 2017-08-25 | End: 2017-08-29

## 2017-08-25 RX ORDER — AMOXICILLIN 250 MG
1-2 CAPSULE ORAL 2 TIMES DAILY
Qty: 30 TABLET | Refills: 0 | Status: SHIPPED | OUTPATIENT
Start: 2017-08-25 | End: 2023-02-10

## 2017-08-25 RX ADMIN — OXYCODONE HYDROCHLORIDE 5 MG: 5 TABLET ORAL at 00:41

## 2017-08-25 RX ADMIN — OXYCODONE HYDROCHLORIDE 5 MG: 5 TABLET ORAL at 04:34

## 2017-08-25 RX ADMIN — ACETAMINOPHEN 650 MG: 325 TABLET, FILM COATED ORAL at 17:36

## 2017-08-25 RX ADMIN — OXYCODONE HYDROCHLORIDE 5 MG: 5 TABLET ORAL at 09:20

## 2017-08-25 RX ADMIN — ACETAMINOPHEN 650 MG: 325 TABLET, FILM COATED ORAL at 00:41

## 2017-08-25 RX ADMIN — OXYCODONE HYDROCHLORIDE 10 MG: 5 TABLET ORAL at 17:36

## 2017-08-25 RX ADMIN — ACETAMINOPHEN 650 MG: 325 TABLET, FILM COATED ORAL at 04:34

## 2017-08-25 RX ADMIN — ACETAMINOPHEN 650 MG: 325 TABLET, FILM COATED ORAL at 13:17

## 2017-08-25 RX ADMIN — ACETAMINOPHEN 650 MG: 325 TABLET, FILM COATED ORAL at 21:37

## 2017-08-25 RX ADMIN — OXYCODONE HYDROCHLORIDE 10 MG: 5 TABLET ORAL at 13:17

## 2017-08-25 RX ADMIN — ENOXAPARIN SODIUM 40 MG: 40 INJECTION SUBCUTANEOUS at 15:33

## 2017-08-25 RX ADMIN — OXYCODONE HYDROCHLORIDE 10 MG: 5 TABLET ORAL at 21:37

## 2017-08-25 RX ADMIN — OXYCODONE HYDROCHLORIDE 5 MG: 5 TABLET ORAL at 12:15

## 2017-08-25 ASSESSMENT — PAIN DESCRIPTION - DESCRIPTORS
DESCRIPTORS: ACHING;CONSTANT
DESCRIPTORS: CONSTANT;ACHING
DESCRIPTORS: ACHING;CONSTANT

## 2017-08-25 NOTE — PLAN OF CARE
Problem: Goal Outcome Summary  Goal: Goal Outcome Summary  Outcome: No Change  Patient AxO. VSS. Slept throughout the night. Taking prn oxycodone and tylenol for right knee pain. Denies any chest pain, SOB, numbness or tingling. Voiding without difficult by ambulating to the bathroom indpendently with walker. Tolerating regular diet. Reports BM on 8/24. Patient was able to use call light and call light remained within reach for the duration of the shift. Continue POC.

## 2017-08-25 NOTE — PLAN OF CARE
Problem: Perioperative Period (Adult)  Goal: Signs and Symptoms of Listed Potential Problems Will be Absent or Manageable (Perioperative Period)  Signs and symptoms of listed potential problems will be absent or manageable by discharge/transition of care (reference Perioperative Period (Adult) CPG).   Outcome: Improving      VS:     VSS   Output:     Voids spontaneously using bathroom independently; Last BM 8/24, Refused stool softeners   Activity:     Up independently in room and to bathroom  Pt activity increased to walk 3x daily.    Skin: WDL   Pain:     Pain managed using PRN oxycodone 5mg e4h  Not effective;  MAR adjusted to 10mg e4hr  Ice applied   CMS:     WDL   Dressing:     None   Diet:     Regular   LDA:     IV saline locked lt AC   Equipment:     None   Plan:     Continue pain management; Pt to return 8/25 for bone biopsy.     Additional Info:     Encourage to be up for all meals. Pt likes to have tylenol and oxy given at the same time.

## 2017-08-25 NOTE — DISCHARGE SUMMARY
ORTHOPAEDIC DISCHARGE SUMMARY    Date of Admission: 8/23/2017  Date of Discharge: 8/29/2017  Disposition: Home  Staff Physician: Neymar Landers MD    DISCHARGE DIAGNOSIS:   Acute right knee pain - workup pending for etiology  After the patient was discharged his pathology report showed likely osteomyelitis.    PROCEDURES:   Open biopsy of the right femur      HOSPITAL COURSE:   22 year old male with acute right knee pain transferred here from outside hospital with MRI concerning for osteomyelitis versus osteosarcoma. An over read of MRI was performed and a biopsy was scheduled for Monday 08/28/17. Hospital course without complication. Right distal femur open biopsy performed on 8/28/17 by Dr. Landers.  At time of discharge, patient was tolerating PO pain control, voiding, and eating a normal diet.        PHYSICAL EXAMINATION:    See progress note from day of discharge             PLANNED DISCHARGE ORDERS:          Discharge Procedure Orders  Reason for your hospital stay   Order Comments: Right leg pain with concerning MRI images requiring additional workup     When to contact your care team   Order Comments: CALL YOUR PHYSICIAN IF:  1.  Your pain begins to worsen and is unable to be controlled with your medications.  2.  You have a temperature elevation greater than 101.5    3.  You have increased pain, swelling or redness in your knee or if you develop numbness or weakness in your leg or foot.    During regular business hours [place] [facility] [place] [phone] and ask for the triage nurse. After hours or weekends call the hospital  at [phone] and ask for the ortho resident on call.     Adult [facility] Follow-up and recommended labs and tests   Order Comments: We will call you when the results of your biopsy have returned, and we will coordinate your follow up visit at that time based on the results.    Appointments on [place] and/or [facility] (with [facility] or [facility] provider or service). Call  [phone]. To call the hospital and speak to the  please call [phone].     Activity   Order Comments: Your activity upon discharge: Activity as tolerated. Use the crutches to minimize weight bearing on the affected extremity.  You may remove your dressings 3 days after surgery, leave steri strips in place though and allow to fall off on their own. May shower at that time. No bathing or immersion in water x 2 weeks.   Order Specific Question Answer Comments   Is discharge order? Yes      Diet   Order Comments: Follow this diet upon discharge: Normal diet. You should not eat or drink anything by mouth starting at 12:00am the night before your surgery.   Order Specific Question Answer Comments   Is discharge order? Yes          Alexandru Hou MD  08/29/2017  Orthopaedic Surgery, PGY-1  Pager: [phone]

## 2017-08-25 NOTE — PHARMACY
Prescriber Notification Note    The pharmacist has communicated with this patient's provider regarding a concern or therapy recommendation.    Notified Person: Jim Bran NP  Date/Time of Notification: 8/25/17 @7914  Interaction: Face to face  Concern/Recommendation:  Per the ortho progress note, patient is to start Lovenox, no order for Lovenox placed.     Comments/Additional Details:  VORB to start Lovenox 40 mg daily for today and tomorrow only (2 doses), patient is having a biopsy on Monday.

## 2017-08-25 NOTE — PROGRESS NOTES
"[facility] Internal Medicine Progress Note            Interval History:   Record reviewed.  Discussed with ortho.  Seen with RN.  Initial plan to DC with re-admit 8/28 for RLE biopsy.  Resides in [place], indication of inadequate pain control, mobility concerns - with decision to keep in hospital over weekend.  Clinical issues as above.  Up without LH - unable to bear weight right LE.  No CP, SOB, cough, nausea, reflux, abd pain.  BM 8/25.  Voiding OK.        Medications:   All medications reviewed today          Physical Exam:   Blood pressure 134/79, pulse 65, temperature 96.2  F (35.7  C), resp. rate 16, height 1.753 m (5' 9\"), weight 82.3 kg (181 lb 6.4 oz), SpO2 95 %.  No intake or output data in the 24 hours ending 08/24/17 1722    General:  Alert.  Appropriate.  No distress.     Heent:      Neck:    Skin:    Chest:  clear    Cardiac:  reg without gallop, murmur.     Abdomen:  Non distended, soft, non tender.  BS normal.     Extremities:  No edema.  R knee effusion per ortho with tenderness extending into popliteal fossa.     Neuro:            Data:     Results for orders placed or performed during the hospital encounter of 08/23/17 (from the past 24 hour(s))   MR Femur Right w/o & w Contrast    Narrative    EXAM: MR FEMUR RIGHT W/O & W CONTRAST  8/24/2017 7:45 PM      HISTORY: eval for R femur neoplastic process    COMPARISON: Outside right knee MRI 8/21/2017    TECHNIQUE: Multiplanar multisequence MR imaging of the femurs before  and after the administration of IV contrast.    Contrast: 8.2ml gadavist    FINDINGS: Similar to recent outside MRI 8/21/2017 there is abnormal  lobular T1 hypointense, T2 hyperintense signal in the distal shaft of  the right femur with the abnormal T2 hyperintense signal extending  into the metaphysis and epiphysis across the physeal scar. 1 scattered  patchy T2 hyperintense signal extends posteriorly into the proximal  attachments of the medial and lateral heads of the " gastrocnemius and  into the anterior prefemoral fat. On today's study the small moderate  joint effusion demonstrates peripheral rim enhancement. There is also  patchy enhancement of the distal right femur and the areas of altered  signal on the other sequences. Remainder of the right femur and right  hip have normal T1 signal.     Normal T1 marrow signal in the left femur, hip, and knee. Limited  evaluation of internal derangement on large field-of-view imaging. No  abnormal hip or left knee effusion.    Apart from the distal right femur there is no evidence of abnormal  muscular edema. Muscle bulk is normal.       Impression    IMPRESSION:   1. Redemonstration of abnormal T1/T2 signal in the distal right femur  extending into the epiphysis. Today's study performed with IV contrast  demonstrates abnormal associated patchy enhancement within the marrow  an adjacent soft tissues and also rim enhancement of the small joint  effusion. This is highly suggestive of infection.  2. Normal MR appearance of the hips and left knee.                  Assessment and Plan:   1)  RLE pain, fever, etiology unclear.  Off ABs.  Differential including osteomyelitis vs neoplasm.  2)  Depression/PTSD, compensated.  3)  Seasonal allergies, quiescent.   4)  General good health.    PLAN:  1)  Cancel DC due to mobility concerns and pain control.  2)  Biopsy planned by ortho 8/28 - procedure low risk.  No c/o's or findings to contra indicate.  2)  Lovenox until 8/27.   3)  Monitor clinically.    Disposition Plan   Expected discharge not determined pending biopsy results.      Entered: Keshawn Weiss 08/25/2017, 3:19 PM              Attestation:  I have reviewed today's vital signs, notes, medications, labs and imaging.     Keshawn Weiss MD

## 2017-08-25 NOTE — PROGRESS NOTES
08/25/17 1200   Quick Adds   Type of Visit Initial PT Evaluation   Living Environment   Lives With friend(s)   Living Arrangements house   Home Accessibility stairs to enter home   Number of Stairs to Enter Home 1   Stair Railings at Home none   Transportation Available car;family or friend will provide   Self-Care   Equipment Currently Used at Home crutches, axillary   Functional Level Prior   Ambulation 0-->independent   Transferring 0-->independent   Toileting 0-->independent   Bathing 0-->independent   Dressing 0-->independent   Eating 0-->independent   Communication 0-->understands/communicates without difficulty   Swallowing 0-->swallows foods/liquids without difficulty   Cognition 0 - no cognition issues reported   Fall history within last six months yes   Number of times patient has fallen within last six months 1   Which of the above functional risks had a recent onset or change? ambulation   Prior Functional Level Comment Pt states he was trained in CR at previous hospitalization, has 2 pair at home   General Information   Onset of Illness/Injury or Date of Surgery - Date 08/08/17   Referring Physician Yina Fernandez   Patient/Family Goals Statement pt uncertain, awaiting biopsy 8/28   Pertinent History of Current Problem (include personal factors and/or comorbidities that impact the POC) PT started with ache in R thigh, with worsening pain, admitted to another hospital 8/16, in [place], then transferred here after MRI suggestive of osteosarcoma vs osteomylitis   Precautions/Limitations fall precautions   Weight-Bearing Status - LUE full weight-bearing   Weight-Bearing Status - RUE full weight-bearing   Weight-Bearing Status - LLE full weight-bearing   Weight-Bearing Status - RLE weight-bearing as tolerated   General Observations pt places minimal WB on R due to pain   Cognitive Status Examination   Orientation orientation to person, place and time   Level of Consciousness alert   Follows Commands and  "Answers Questions 100% of the time   Personal Safety and Judgment intact   Memory intact   Pain Assessment   Patient Currently in Pain Yes, see Vital Sign flowsheet  (6-8/10)   Range of Motion (ROM)   ROM Comment R knee ROM limited by pain, lacking 15 degrees extension, has about 75 degrees of flexion   Strength   Strength Comments NT secondary to pain, limited by pain   Bed Mobility   Bed Mobility Comments bed mob I   Transfer Skills   Transfer Comments transfers I with NWB on R   Gait   Gait Comments amb with CR with supervision, 30'   Balance   Balance Comments impaired standing dynamic balance secondary to limited WB caitlyn on R   Coordination   Coordination no deficits were identified   Muscle Tone   Muscle Tone no deficits were identified   General Therapy Interventions   Planned Therapy Interventions gait training;ROM   Clinical Impression   Criteria for Skilled Therapeutic Intervention yes, treatment indicated   PT Diagnosis difficulty walking   Influenced by the following impairments pain   Functional limitations due to impairments impaired gait safety   Clinical Presentation Stable/Uncomplicated   Clinical Presentation Rationale clincial judgement   Clinical Decision Making (Complexity) Low complexity   Therapy Frequency` daily   Predicted Duration of Therapy Intervention (days/wks) 1 day   Anticipated Discharge Disposition Home with Assist   Risk & Benefits of therapy have been explained Yes   Patient, Family & other staff in agreement with plan of care Yes   [facility] [employer] TM \"6 Clicks\"   2016, Trustees of [facility], under license to [employer].  All rights reserved.   6 Clicks Short Forms Basic Mobility Inpatient Short Form   [facility] [employer]  \"6 Clicks\" V.2 Basic Mobility Inpatient Short Form   1. Turning from your back to your side while in a flat bed without using bedrails? 4 - None   2. Moving from lying on your back to sitting on the side of a flat bed without using " bedrails? 4 - None   3. Moving to and from a bed to a chair (including a wheelchair)? 4 - None   4. Standing up from a chair using your arms (e.g., wheelchair, or bedside chair)? 4 - None   5. To walk in hospital room? 4 - None   6. Climbing 3-5 steps with a railing? 4 - None   Basic Mobility Raw Score (Score out of 24.Lower scores equate to lower levels of function) 24   Total Evaluation Time   Total Evaluation Time (Minutes) 10

## 2017-08-25 NOTE — PROGRESS NOTES
Orthopaedic Surgery Progress Note 08/25/2017    E: No acute events overnight.    S: Pain generally well controlled. Denies numbness or tingling. No fever, aches or chills. Plan of care reviewed and questions answered     O:  Temp: 96.2  F (35.7  C) Temp src: Oral BP: 134/79 Pulse: 65   Resp: 16 SpO2: 95 % O2 Device: None (Room air)      Exam:  Gen: No acute distress, resting comfortably in bed.  Resp: Non-labored breathing  MSk - RLE: Full range of motion in hip and ankle with minimal pain, decreased range of motion and knee joint to 80  of flexion secondary to pain, appreciable knee effusion with suprapatellar pouch swelling, increased temperature compared to contralateral knee, tenderness over lateral joint line and into popliteal fossa of knee, distal extremity neurovascularly intact      Recent Labs  Lab 08/24/17  0636 08/23/17  2127   WBC 6.1  --    HGB 13.9  --      --    CRP  --  117.0*       Assessment: Ariel Triplett is a 22-year-old male transferred from outside hospital with 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  Weight bearing status: WBAT  Antibiotics: No pre-op antibiotics   Diet: Normal diet  DVT prophylaxis: lovenox   Pain management: Scheduled tylenol and prn oxycodone    Physical Therapy: ROM  Labs: Follow inflammatory labs   Consults: Internal medecine for comanagemet with plans to have biopsy     Plan: Open biopsy of right distal femur with Dr. Landers Monday at 5:30 pm    Plan to stay inpatient for pain management until time of biopsy     BREANNE Hou MD 08/25/2017  Orthopaedic Surgery Resident, PGY-1  Pager: [phone]

## 2017-08-25 NOTE — PLAN OF CARE
Problem: Goal Outcome Summary  Goal: Goal Outcome Summary  PT eval and treatment completed,  Pt admitted for R knee pain, further workup.   Pt's previous level of function was I and active.   Pt's current level is I with bed mob and transfers, able to amb with CR in dodd with mod I/supervision. Able to complete 3 steps with B CR with SBA/mod I.  Pt's deficits include increased pain with WB limiting gait caitlyn. Decreased ROM due to pain.  Pt awaiting biopsy 8/28.  No current skilled PT needs.  Rec pt cont to amb in dodd 3 x day, and complete AAROM as caitlyn. Goals met for CR for pt.  Will complete order.  Anticipate dc home pending procedure.     Physical Therapy Discharge Summary    Reason for therapy discharge:    All goals and outcomes met, no further needs identified.    Progress towards therapy goal(s). See goals on Care Plan in [facility] electronic health record for goal details.  Goals met    Therapy recommendation(s):    Continue home exercise program.

## 2017-08-26 PROCEDURE — 12000001 ZZH R&B MED SURG/OB UMMC

## 2017-08-26 PROCEDURE — 99232 SBSQ HOSP IP/OBS MODERATE 35: CPT | Performed by: INTERNAL MEDICINE

## 2017-08-26 PROCEDURE — 25000132 ZZH RX MED GY IP 250 OP 250 PS 637: Performed by: STUDENT IN AN ORGANIZED HEALTH CARE EDUCATION/TRAINING PROGRAM

## 2017-08-26 PROCEDURE — 25000128 H RX IP 250 OP 636: Performed by: NURSE PRACTITIONER

## 2017-08-26 PROCEDURE — 25000132 ZZH RX MED GY IP 250 OP 250 PS 637: Performed by: INTERNAL MEDICINE

## 2017-08-26 RX ORDER — GABAPENTIN 100 MG/1
100 CAPSULE ORAL 3 TIMES DAILY
Status: DISCONTINUED | OUTPATIENT
Start: 2017-08-26 | End: 2017-08-29 | Stop reason: HOSPADM

## 2017-08-26 RX ADMIN — GABAPENTIN 100 MG: 100 CAPSULE ORAL at 15:56

## 2017-08-26 RX ADMIN — OXYCODONE HYDROCHLORIDE 10 MG: 5 TABLET ORAL at 17:50

## 2017-08-26 RX ADMIN — OXYCODONE HYDROCHLORIDE 10 MG: 5 TABLET ORAL at 01:17

## 2017-08-26 RX ADMIN — ACETAMINOPHEN 650 MG: 325 TABLET, FILM COATED ORAL at 22:03

## 2017-08-26 RX ADMIN — ENOXAPARIN SODIUM 40 MG: 40 INJECTION SUBCUTANEOUS at 14:33

## 2017-08-26 RX ADMIN — OXYCODONE HYDROCHLORIDE 10 MG: 5 TABLET ORAL at 22:03

## 2017-08-26 RX ADMIN — OXYCODONE HYDROCHLORIDE 10 MG: 5 TABLET ORAL at 09:35

## 2017-08-26 RX ADMIN — ACETAMINOPHEN 650 MG: 325 TABLET, FILM COATED ORAL at 13:38

## 2017-08-26 RX ADMIN — ACETAMINOPHEN 650 MG: 325 TABLET, FILM COATED ORAL at 09:35

## 2017-08-26 RX ADMIN — OXYCODONE HYDROCHLORIDE 10 MG: 5 TABLET ORAL at 05:26

## 2017-08-26 RX ADMIN — OXYCODONE HYDROCHLORIDE 10 MG: 5 TABLET ORAL at 13:38

## 2017-08-26 RX ADMIN — ACETAMINOPHEN 650 MG: 325 TABLET, FILM COATED ORAL at 17:50

## 2017-08-26 RX ADMIN — ACETAMINOPHEN 650 MG: 325 TABLET, FILM COATED ORAL at 05:26

## 2017-08-26 RX ADMIN — GABAPENTIN 100 MG: 100 CAPSULE ORAL at 21:02

## 2017-08-26 RX ADMIN — ACETAMINOPHEN 650 MG: 325 TABLET, FILM COATED ORAL at 01:17

## 2017-08-26 NOTE — PLAN OF CARE
Problem: Goal Outcome Summary  Goal: Goal Outcome Summary  Outcome: No Change  VS:  VSS A&O to 4, requests not to be woken at night unless necessary, pleasant and cooperative.      Output:  Voids spontaneously, adequate output   Activity:  ambulates to BR   Pain:  PRN oxycodone and Tylenol have been effective for pain to R knee. Ice applied as well    CMS:  Intact   Dressing:  none   Diet:  No N/V   LDA:  None    Equipment:  none   Plan:  Continue with current plan of care.

## 2017-08-26 NOTE — PLAN OF CARE
Problem: Goal Outcome Summary  Goal: Goal Outcome Summary          VS:        Pt A/O X 4. Afebrile. VSS. Lungs-clear bilaterally with both       anterior and posterior. IS encouraged. Denies nausea, shortness of breath, and chest pain.      Output:        Bowels-active in all four quadrants. Last BM 8/25 per pt report. Voids spontaneously without difficulty in the bathroom.       Activity:        Pt up in room, in hallways, and to shower with assist of standby and crutches.      Skin:    Intact .      Pain:        Has pain in the right knee and given prn Oxycodone, prn Tylenol and is tolerating well. MD also ordered scheduled Gabapentin to be started this shift.       CMS:        CMS and Neuro's are intact. Denies numbness and tingling in all extremities.      Dressing:               Diet:        Pt is on a regular diet and appetite was good this shift.        LDA:        Pt has no PIV access.       Equipment:        Bilateral heels are elevated off the bed.      Plan:        Pt is able to make needs known and the call light is within the pt's reach. Continue to monitor.       Additional Info:        Plan is for surgery on Monday at 5:00 PM with Dr. Landers.

## 2017-08-26 NOTE — PROGRESS NOTES
"[facility] Internal Medicine Progress Note            Interval History:   Record reviewed.  Seen with RN.  In over weekend pending RLE biopsy Mon.  Up without LH - unable to bear weight right LE.  No CP, SOB, cough, nausea, reflux, abd pain.  BM 8/25.  Voiding OK.  Fair pain control despite increase oxycodone.          Medications:   All medications reviewed today          Physical Exam:   Blood pressure 121/74, pulse 86, temperature 95.9  F (35.5  C), temperature source Oral, resp. rate 16, height 1.753 m (5' 9\"), weight 82.3 kg (181 lb 6.4 oz), SpO2 96 %.  No intake or output data in the 24 hours ending 08/24/17 1722    General:  Alert.  Appropriate.  No clinical distress.     Heent:      Neck:    Skin:    Chest:  clear    Cardiac:  reg without gallop, murmur.     Abdomen:  Non distended, soft, non tender.  BS normal.     Extremities:  No edema.  R knee effusion per ortho with tenderness extending into popliteal fossa.     Neuro:            Data:     No results found for this or any previous visit (from the past 24 hour(s)).             Assessment and Plan:   1)  RLE pain, fever, etiology unclear.  Off ABs.  Remains afebrile in hospital. Differential including osteomyelitis vs neoplasm.  Fair pain control.   2)  Depression/PTSD, compensated.  3)  Seasonal allergies, quiescent.   4)  General good health.    PLAN:  1)  Monitor over weekend.   2)  Biopsy planned by ortho 8/28 - procedure low risk.  No c/o's or findings to contra indicate.  3)    Lovenox until 8/27.   4)  Same opiates.  Add neurontin.  5)  Monitor clinically.    Disposition Plan   Expected discharge not determined pending biopsy results.      Entered: Keshawn Weiss 08/26/2017, 2:37 PM              Attestation:  I have reviewed today's vital signs, notes, medications, labs and imaging.     Keshawn Weiss MD          "

## 2017-08-26 NOTE — PLAN OF CARE
Pt Alert and Oriented X 4. Afebrile. VSS. Lungs- Clear bilaterally with both anterior and posterior. IS encouraged. Bowels- Hyperactive in all four quadrants, had BM this evening. PP+ DP+. CMS and Neuro's are intact to baseline. Denies numbness and tingling in all extremities. Denies nausea, shortness of breath, and chest pain. Has stabbing pain in the right hip, knee and ankle and given PRN tylenol and oxycodone around clock, ICE applied, and is tolerating well. Voids spontaneously without difficulty, uses urinal at bedside. Is on a regular diet and appetite was Good this shift. Right ankle is slightly swollen and Bilateral heels are elevated off the bed. Pt is able to make needs known and the call light is within the pt's reach. Continue to monitor.

## 2017-08-26 NOTE — PROGRESS NOTES
Orthopaedic Surgery Progress Note 08/26/2017    E: No acute events overnight.    S: Pain generally well controlled. Ambulates with use of walker. Tolerating PO, voiding. Denies numbness or tingling. No fever, aches or chills.    O:  Temp: 96.4  F (35.8  C) Temp src: Oral BP: 130/73 Pulse: 77   Resp: 16 SpO2: 98 % O2 Device: None (Room air)      Exam:  Gen: No acute distress, resting comfortably in bed.  Resp: Non-labored breathing  MSk - RLE: Full range of motion in hip and ankle with minimal pain, decreased range of motion and knee joint to 80  of flexion secondary to pain,  5/5 TA GSC EHL FHL, SILT throughout foot, 2+ dp pulse      Recent Labs  Lab 08/24/17  0636 08/23/17  2127   WBC 6.1  --    HGB 13.9  --      --    CRP  --  117.0*       Assessment: Ariel Triplett is a 22-year-old male transferred from outside with right knee pain and imaging concerning for neoplasm vs osteomyelitis. Inpatient for pain control and decreased mobility, plan for OR Monday for open biopsy right distal femur.    Plan:  Orthopedic Primary  Activity: Up with assist.  Weight bearing status: WBAT  Antibiotics: No pre-op antibiotics   Diet: Normal diet  DVT prophylaxis: lovenox  Pain management: Scheduled tylenol and prn oxycodone    Physical Therapy: mobility  Consults: Internal medecine    Plan: Open biopsy of right distal femur with Dr. Landers Monday at 5:00 pm      Mi Daugherty MD 08/26/2017  Orthopaedic Surgery Resident, PGY-4  Pager: [phone]

## 2017-08-26 NOTE — PLAN OF CARE
Problem: Perioperative Period (Adult)  Goal: Signs and Symptoms of Listed Potential Problems Will be Absent or Manageable (Perioperative Period)  Signs and symptoms of listed potential problems will be absent or manageable by discharge/transition of care (reference Perioperative Period (Adult) CPG).   Pt A/O X 4. Afebrile. VSS. Lungs clear bilaterally with both anterior and posterior. IS encouraged. Bowels active in all four quadrants. CMS and Neuro's are intact. Denies numbness and tingling in all extremities. Denies nausea, shortness of breath, and chest pain. Has pain in the R knee radiating to the ankle, and given prn oxycodone and tylenol every 4 hours. ICE applied and is tolerating well. Voids spontaneously without difficulty in the bathroom. Is on a regular diet and appetite was tolerated well this shift. DVT prevention with Lovenox and increased mobility. Pt up stand by assit with a walker. PIV was removed and no longer needed, per pt's request. Bilateral heels are elevated off the bed, independent with mobility in bed. Pt is able to make needs known and the call light is within the pt's reach. Continue to monitor.

## 2017-08-27 ENCOUNTER — ANESTHESIA EVENT (OUTPATIENT)
Dept: SURGERY | Facility: CLINIC | Age: 22
End: 2017-08-27
Payer: MEDICAID

## 2017-08-27 PROCEDURE — 99207 ZZC CDG-MDM COMPONENT: MEETS MODERATE - UP CODED: CPT | Performed by: INTERNAL MEDICINE

## 2017-08-27 PROCEDURE — 25000132 ZZH RX MED GY IP 250 OP 250 PS 637: Performed by: STUDENT IN AN ORGANIZED HEALTH CARE EDUCATION/TRAINING PROGRAM

## 2017-08-27 PROCEDURE — 25000132 ZZH RX MED GY IP 250 OP 250 PS 637: Performed by: INTERNAL MEDICINE

## 2017-08-27 PROCEDURE — 12000001 ZZH R&B MED SURG/OB UMMC

## 2017-08-27 PROCEDURE — 99232 SBSQ HOSP IP/OBS MODERATE 35: CPT | Performed by: INTERNAL MEDICINE

## 2017-08-27 RX ORDER — CHLORHEXIDINE GLUCONATE 40 MG/ML
SOLUTION TOPICAL ONCE
Status: COMPLETED | OUTPATIENT
Start: 2017-08-27 | End: 2017-08-27

## 2017-08-27 RX ORDER — CHLORHEXIDINE GLUCONATE 40 MG/ML
SOLUTION TOPICAL ONCE
Status: DISCONTINUED | OUTPATIENT
Start: 2017-08-27 | End: 2017-08-28 | Stop reason: HOSPADM

## 2017-08-27 RX ADMIN — GABAPENTIN 100 MG: 100 CAPSULE ORAL at 08:02

## 2017-08-27 RX ADMIN — OXYCODONE HYDROCHLORIDE 10 MG: 5 TABLET ORAL at 14:32

## 2017-08-27 RX ADMIN — CHLORHEXIDINE GLUCONATE: 40 SOLUTION TOPICAL at 19:50

## 2017-08-27 RX ADMIN — OXYCODONE HYDROCHLORIDE 10 MG: 5 TABLET ORAL at 10:21

## 2017-08-27 RX ADMIN — OXYCODONE HYDROCHLORIDE 10 MG: 5 TABLET ORAL at 06:24

## 2017-08-27 RX ADMIN — ACETAMINOPHEN 650 MG: 325 TABLET, FILM COATED ORAL at 02:16

## 2017-08-27 RX ADMIN — ACETAMINOPHEN 650 MG: 325 TABLET, FILM COATED ORAL at 06:24

## 2017-08-27 RX ADMIN — ACETAMINOPHEN 650 MG: 325 TABLET, FILM COATED ORAL at 18:31

## 2017-08-27 RX ADMIN — ACETAMINOPHEN 650 MG: 325 TABLET, FILM COATED ORAL at 22:55

## 2017-08-27 RX ADMIN — GABAPENTIN 100 MG: 100 CAPSULE ORAL at 20:09

## 2017-08-27 RX ADMIN — OXYCODONE HYDROCHLORIDE 10 MG: 5 TABLET ORAL at 02:15

## 2017-08-27 RX ADMIN — OXYCODONE HYDROCHLORIDE 10 MG: 5 TABLET ORAL at 22:55

## 2017-08-27 RX ADMIN — ACETAMINOPHEN 650 MG: 325 TABLET, FILM COATED ORAL at 10:21

## 2017-08-27 RX ADMIN — OXYCODONE HYDROCHLORIDE 10 MG: 5 TABLET ORAL at 18:31

## 2017-08-27 RX ADMIN — ACETAMINOPHEN 650 MG: 325 TABLET, FILM COATED ORAL at 14:32

## 2017-08-27 RX ADMIN — GABAPENTIN 100 MG: 100 CAPSULE ORAL at 14:32

## 2017-08-27 ASSESSMENT — LIFESTYLE VARIABLES: TOBACCO_USE: 1

## 2017-08-27 NOTE — PLAN OF CARE
"Problem: Goal Outcome Summary  Goal: Goal Outcome Summary  Outcome: No Change  VS:  VSS, A&O to 4 makes needs known,    Output:  Voids spontaneously in urinal at bedside   Activity:  SBA  With ambulation   Pain:  C/o pain to R knee \"stabbing\"  Continues PRN oxycodone and tylenol q 4 hours   CMS:  Intact denies N/T    Dressing:  none   Diet:  Tolerating regular diet; denies N/V   LDA:  none   Equipment:  Crutches   Plan:  Continue plan of care.                 "

## 2017-08-27 NOTE — PROGRESS NOTES
"[facility] Internal Medicine Progress Note            Interval History:   Record reviewed.  discussed with RN.  In over weekend pending RLE biopsy tomorrow.   Up without LH - unable to bear weight right LE.  No CP, SOB, cough, nausea, reflux, abd pain.  BM today.   Voiding OK.  Pain at \"tolerable\" level.  No appreciated impact from added neurontin.          Medications:   All medications reviewed today          Physical Exam:   Blood pressure 128/72, pulse 103, temperature 95.9  F (35.5  C), resp. rate 18, height 1.753 m (5' 9\"), weight 82.3 kg (181 lb 6.4 oz), SpO2 96 %.  No intake or output data in the 24 hours ending 08/24/17 1722    General:  Alert.  Appropriate.  No clinical distress.     Heent:      Neck:    Skin:    Chest:  clear    Cardiac:  reg without gallop, murmur.     Abdomen:  Non distended, soft, non tender.  BS normal.     Extremities:  No edema.  R knee effusion per ortho with tenderness extending into popliteal fossa.     Neuro:            Data:     No results found for this or any previous visit (from the past 24 hour(s)).             Assessment and Plan:   1)  RLE pain, fever, etiology unclear.  Off ABs.  Remains afebrile in hospital. Differential including osteomyelitis vs neoplasm.  Pain level appears tolerable.   2)  Depression/PTSD, compensated.  3)  Seasonal allergies, quiescent.   4)  General good health.    PLAN:  1)   Biopsy planned by ortho 8/28 - procedure low risk.  No c/o's or findings to contra indicate.  2)  DC lovenox for procedure 8/28.  3)   Same opiates.  Bowel meds.  4)  Monitor clinically.    Disposition Plan   Expected discharge not determined pending biopsy results.      Entered: Keshawn Weiss 08/27/2017, 1:16 PM              Attestation:  I have reviewed today's vital signs, notes, medications, labs and imaging.     Keshawn Weiss MD          "

## 2017-08-27 NOTE — PROGRESS NOTES
Orthopaedic Surgery Progress Note 08/27/2017    E: No acute events overnight.    S: Pain well controlled. Ambulated with use of walker yesterday. Tolerating PO, voiding. Denies numbness or tingling. No fever, aches or chills.    O:  Temp: 96.2  F (35.7  C) Temp src: Oral BP: 132/75 Pulse: 93   Resp: 16 SpO2: 98 % O2 Device: None (Room air)      Exam:  Gen: No acute distress, resting comfortably in bed.  Resp: Non-labored breathing  MSk - RLE: Full range of motion in hip and ankle with minimal pain, decreased range of motion and knee joint to 80  of flexion secondary to pain,  5/5 TA GSC EHL FHL, SILT throughout foot, 2+ dp pulse      Recent Labs  Lab 08/24/17  0636 08/23/17  2127   WBC 6.1  --    HGB 13.9  --      --    CRP  --  117.0*       Assessment: Ariel Triplett is a 22-year-old male transferred from outside with right knee pain and imaging concerning for neoplasm vs osteomyelitis. Inpatient for pain control and decreased mobility, plan for OR tomorrow for open biopsy right distal femur.    Plan:  Orthopedic Primary  Activity: Up with assist.  Weight bearing status: WBAT  Antibiotics: No pre-op antibiotics   Diet: NPO at midnight  DVT prophylaxis: lovenox, hold tomorrow for OR  Pain management: Scheduled tylenol and prn oxycodone    Physical Therapy: mobility  Consults: Internal medecine    Plan: Open biopsy of right distal femur with Dr. Landers Monday at 5:00 pm      Mi Daugherty MD 08/27/2017  Orthopaedic Surgery Resident, PGY-4  Pager: [phone]

## 2017-08-27 NOTE — PLAN OF CARE
Problem: Goal Outcome Summary  Goal: Goal Outcome Summary  Outcome: No Change            VS:    VSS   Output:    Voiding good   Activity:    Up with SBA using crutches, ambulated in the hallway 2x this shift   Skin: intact   Pain:    Tylenol 650 mg po  and Roxicodone 10 mg po q 4 maintaining the pain at tolerable level.   CMS:    intact   Dressing:    n/a   Diet:    regular   LDA:    none   Equipment:        Plan:    Surgery tomorrow   Additional Info:     A&O x 4. Tolerating diet.  Able to make needs known. NPO will start MN. Will continue to monitor.

## 2017-08-27 NOTE — ANESTHESIA PREPROCEDURE EVALUATION
Anesthesia Evaluation     . Pt has not had prior anesthetic            ROS/MED HX    ENT/Pulmonary:     (+)allergic rhinitis, tobacco use, Current use 1/4 packs/day  , . .   (-) asthma and recent URI   Neurologic:  - neg neurologic ROS    (-) seizures   Cardiovascular:  - neg cardiovascular ROS       METS/Exercise Tolerance: Comment: Had been walking 4 miles per day  >4 METS   Hematologic:  - neg hematologic  ROS       Musculoskeletal: Comment: R leg pain concerning for osteomyelitis vs osteosarcoma        GI/Hepatic:  - neg GI/hepatic ROS       Renal/Genitourinary:  - ROS Renal section negative       Endo:  - neg endo ROS       Psychiatric:     (+) psychiatric history depression and other (comment) (PTSD)      Infectious Disease:         Malignancy:         Other:                   Procedure: Procedure(s):  Open Biopsy Right Distal Femur  - Wound Class:     HPI: Ariel Triplett is a 22 year old male with PMHx of tobacco use (smoker 1/4 ppd), depression/PTSD who is scheduled for above procedure for osteomyelitis vs. Osteosarcoma. The patient has had R knee and hip pain since 8/8. He was admitted to OSH on 8/16 and transferred to [facility] 8/23. He was treated with IV antibiotics and is now receiving oxycodone 5-10 mg q4 hrs for pain control.    PMHx/PSHx:  No past medical history on file.    No past surgical history on file.      No current facility-administered medications on file prior to encounter.   No current outpatient prescriptions on file prior to encounter.    Social Hx:   Social History   Substance Use Topics     Smoking status: Not on file     Smokeless tobacco: Not on file     Alcohol use Not on file       Allergies: Allergies not on file      NPO Status: Per ASA Guidelines    Labs:    Blood Bank:  No results found for: ABO, RH, AS  BMP:  Recent Labs   Lab Test  08/24/17   0636   NA  141   POTASSIUM  4.2   CHLORIDE  104   CO2  29   BUN  12   CR  0.80   GLC  90   CATALINA  9.3     CBC:   Recent Labs   Lab Test   08/24/17   0636   WBC  6.1   RBC  4.64   HGB  13.9   HCT  41.3   MCV  89   MCH  30.0   MCHC  33.7   RDW  12.1   PLT  389     Coags:  Recent Labs   Lab Test  08/24/17   0636   INR  1.04   PTT  35             Physical Exam      Airway   Mallampati: II  TM distance: >3 FB  Neck ROM: full  Comment: Full beard    Dental   (+) chipped    Cardiovascular       Pulmonary                     Anesthesia Plan      History & Physical Review  History and physical reviewed and following examination; no interval change.    ASA Status:  2 .    NPO Status:  > 2 hours and > 6 hours    Plan for General and LMA with Intravenous induction. Maintenance will be Balanced.    PONV prophylaxis:  Ondansetron (or other 5HT-3) and Dexamethasone or Solumedrol  Discussed risks, benefits, and alternatives of general anesthesia with the patient. Risks discussed included nausea/vomiting, sore throat, dental damage, soft tissue damage, problems with heart, brain, lungs, and rare allergic reactions. Patient was given a chance to ask questions. Patient consents to procedure.         Postoperative Care  Postoperative pain management:  IV analgesics and Oral pain medications.      Consents  Anesthetic plan, risks, benefits and alternatives discussed with:  Patient..

## 2017-08-28 ENCOUNTER — ANESTHESIA (OUTPATIENT)
Dept: SURGERY | Facility: CLINIC | Age: 22
End: 2017-08-28
Payer: MEDICAID

## 2017-08-28 ENCOUNTER — SURGERY (OUTPATIENT)
Age: 22
End: 2017-08-28

## 2017-08-28 PROCEDURE — 25000125 ZZHC RX 250: Performed by: ORTHOPAEDIC SURGERY

## 2017-08-28 PROCEDURE — 12000001 ZZH R&B MED SURG/OB UMMC

## 2017-08-28 PROCEDURE — 88280 CHROMOSOME KARYOTYPE STUDY: CPT | Performed by: PATHOLOGY

## 2017-08-28 PROCEDURE — 37000008 ZZH ANESTHESIA TECHNICAL FEE, 1ST 30 MIN: Performed by: ORTHOPAEDIC SURGERY

## 2017-08-28 PROCEDURE — 00000160 ZZHCL STATISTIC H-SPECIAL HANDLING: Performed by: ORTHOPAEDIC SURGERY

## 2017-08-28 PROCEDURE — 71000014 ZZH RECOVERY PHASE 1 LEVEL 2 FIRST HR: Performed by: ORTHOPAEDIC SURGERY

## 2017-08-28 PROCEDURE — 36000059 ZZH SURGERY LEVEL 3 EA 15 ADDTL MIN UMMC: Performed by: ORTHOPAEDIC SURGERY

## 2017-08-28 PROCEDURE — 87176 TISSUE HOMOGENIZATION CULTR: CPT | Performed by: ORTHOPAEDIC SURGERY

## 2017-08-28 PROCEDURE — 71000015 ZZH RECOVERY PHASE 1 LEVEL 2 EA ADDTL HR: Performed by: ORTHOPAEDIC SURGERY

## 2017-08-28 PROCEDURE — 00000159 ZZHCL STATISTIC H-SEND OUTS PREP: Performed by: ORTHOPAEDIC SURGERY

## 2017-08-28 PROCEDURE — 88184 FLOWCYTOMETRY/ TC 1 MARKER: CPT | Performed by: PATHOLOGY

## 2017-08-28 PROCEDURE — 40000170 ZZH STATISTIC PRE-PROCEDURE ASSESSMENT II: Performed by: ORTHOPAEDIC SURGERY

## 2017-08-28 PROCEDURE — 88307 TISSUE EXAM BY PATHOLOGIST: CPT | Performed by: ORTHOPAEDIC SURGERY

## 2017-08-28 PROCEDURE — 25000132 ZZH RX MED GY IP 250 OP 250 PS 637: Performed by: INTERNAL MEDICINE

## 2017-08-28 PROCEDURE — 88342 IMHCHEM/IMCYTCHM 1ST ANTB: CPT | Performed by: ORTHOPAEDIC SURGERY

## 2017-08-28 PROCEDURE — 25000125 ZZHC RX 250: Performed by: NURSE ANESTHETIST, CERTIFIED REGISTERED

## 2017-08-28 PROCEDURE — 25000128 H RX IP 250 OP 636: Performed by: NURSE ANESTHETIST, CERTIFIED REGISTERED

## 2017-08-28 PROCEDURE — 25000128 H RX IP 250 OP 636: Performed by: PHYSICIAN ASSISTANT

## 2017-08-28 PROCEDURE — 88311 DECALCIFY TISSUE: CPT | Mod: 26 | Performed by: ORTHOPAEDIC SURGERY

## 2017-08-28 PROCEDURE — 25000566 ZZH SEVOFLURANE, EA 15 MIN: Performed by: ORTHOPAEDIC SURGERY

## 2017-08-28 PROCEDURE — 88185 FLOWCYTOMETRY/TC ADD-ON: CPT | Performed by: PATHOLOGY

## 2017-08-28 PROCEDURE — 36000057 ZZH SURGERY LEVEL 3 1ST 30 MIN - UMMC: Performed by: ORTHOPAEDIC SURGERY

## 2017-08-28 PROCEDURE — 88239 TISSUE CULTURE TUMOR: CPT | Performed by: PATHOLOGY

## 2017-08-28 PROCEDURE — S0020 INJECTION, BUPIVICAINE HYDRO: HCPCS | Performed by: ORTHOPAEDIC SURGERY

## 2017-08-28 PROCEDURE — 40001004 ZZHCL STATISTIC FLOW INT 9-15 ABY TC 88188: Performed by: PATHOLOGY

## 2017-08-28 PROCEDURE — 88342 IMHCHEM/IMCYTCHM 1ST ANTB: CPT | Mod: 26 | Performed by: ORTHOPAEDIC SURGERY

## 2017-08-28 PROCEDURE — 88264 CHROMOSOME ANALYSIS 20-25: CPT | Performed by: PATHOLOGY

## 2017-08-28 PROCEDURE — 87070 CULTURE OTHR SPECIMN AEROBIC: CPT | Performed by: ORTHOPAEDIC SURGERY

## 2017-08-28 PROCEDURE — 25000128 H RX IP 250 OP 636: Performed by: ANESTHESIOLOGY

## 2017-08-28 PROCEDURE — 27210794 ZZH OR GENERAL SUPPLY STERILE: Performed by: ORTHOPAEDIC SURGERY

## 2017-08-28 PROCEDURE — 88311 DECALCIFY TISSUE: CPT | Performed by: ORTHOPAEDIC SURGERY

## 2017-08-28 PROCEDURE — 88341 IMHCHEM/IMCYTCHM EA ADD ANTB: CPT | Performed by: ORTHOPAEDIC SURGERY

## 2017-08-28 PROCEDURE — 25000132 ZZH RX MED GY IP 250 OP 250 PS 637: Performed by: PHYSICIAN ASSISTANT

## 2017-08-28 PROCEDURE — 0QBB0ZX EXCISION OF RIGHT LOWER FEMUR, OPEN APPROACH, DIAGNOSTIC: ICD-10-PCS | Performed by: ORTHOPAEDIC SURGERY

## 2017-08-28 PROCEDURE — 25000132 ZZH RX MED GY IP 250 OP 250 PS 637: Performed by: STUDENT IN AN ORGANIZED HEALTH CARE EDUCATION/TRAINING PROGRAM

## 2017-08-28 PROCEDURE — 88307 TISSUE EXAM BY PATHOLOGIST: CPT | Mod: 26 | Performed by: ORTHOPAEDIC SURGERY

## 2017-08-28 PROCEDURE — 37000009 ZZH ANESTHESIA TECHNICAL FEE, EACH ADDTL 15 MIN: Performed by: ORTHOPAEDIC SURGERY

## 2017-08-28 PROCEDURE — 87075 CULTR BACTERIA EXCEPT BLOOD: CPT | Performed by: ORTHOPAEDIC SURGERY

## 2017-08-28 PROCEDURE — 88341 IMHCHEM/IMCYTCHM EA ADD ANTB: CPT | Mod: 26 | Performed by: ORTHOPAEDIC SURGERY

## 2017-08-28 RX ORDER — PROPOFOL 10 MG/ML
INJECTION, EMULSION INTRAVENOUS PRN
Status: DISCONTINUED | OUTPATIENT
Start: 2017-08-28 | End: 2017-08-28

## 2017-08-28 RX ORDER — NALOXONE HYDROCHLORIDE 0.4 MG/ML
.1-.4 INJECTION, SOLUTION INTRAMUSCULAR; INTRAVENOUS; SUBCUTANEOUS
Status: DISCONTINUED | OUTPATIENT
Start: 2017-08-28 | End: 2017-08-28 | Stop reason: HOSPADM

## 2017-08-28 RX ORDER — ONDANSETRON 4 MG/1
4 TABLET, ORALLY DISINTEGRATING ORAL EVERY 30 MIN PRN
Status: DISCONTINUED | OUTPATIENT
Start: 2017-08-28 | End: 2017-08-29 | Stop reason: HOSPADM

## 2017-08-28 RX ORDER — KETOROLAC TROMETHAMINE 30 MG/ML
INJECTION, SOLUTION INTRAMUSCULAR; INTRAVENOUS PRN
Status: DISCONTINUED | OUTPATIENT
Start: 2017-08-28 | End: 2017-08-28

## 2017-08-28 RX ORDER — LIDOCAINE HYDROCHLORIDE 20 MG/ML
INJECTION, SOLUTION INFILTRATION; PERINEURAL PRN
Status: DISCONTINUED | OUTPATIENT
Start: 2017-08-28 | End: 2017-08-28

## 2017-08-28 RX ORDER — HYDROXYZINE HYDROCHLORIDE 25 MG/1
25 TABLET, FILM COATED ORAL EVERY 6 HOURS PRN
Status: DISCONTINUED | OUTPATIENT
Start: 2017-08-28 | End: 2017-08-29 | Stop reason: HOSPADM

## 2017-08-28 RX ORDER — DIPHENHYDRAMINE HYDROCHLORIDE 50 MG/ML
25 INJECTION INTRAMUSCULAR; INTRAVENOUS ONCE
Status: CANCELLED | OUTPATIENT
Start: 2017-08-28

## 2017-08-28 RX ORDER — HYDROMORPHONE HCL/0.9% NACL/PF 0.2MG/0.2
0.2 SYRINGE (ML) INTRAVENOUS EVERY 4 HOURS PRN
Status: DISCONTINUED | OUTPATIENT
Start: 2017-08-28 | End: 2017-08-29

## 2017-08-28 RX ORDER — ONDANSETRON 2 MG/ML
4 INJECTION INTRAMUSCULAR; INTRAVENOUS EVERY 30 MIN PRN
Status: DISCONTINUED | OUTPATIENT
Start: 2017-08-28 | End: 2017-08-29 | Stop reason: HOSPADM

## 2017-08-28 RX ORDER — ONDANSETRON 2 MG/ML
INJECTION INTRAMUSCULAR; INTRAVENOUS PRN
Status: DISCONTINUED | OUTPATIENT
Start: 2017-08-28 | End: 2017-08-28

## 2017-08-28 RX ORDER — CEFAZOLIN SODIUM 1 G/3ML
INJECTION, POWDER, FOR SOLUTION INTRAMUSCULAR; INTRAVENOUS PRN
Status: DISCONTINUED | OUTPATIENT
Start: 2017-08-28 | End: 2017-08-28

## 2017-08-28 RX ORDER — FENTANYL CITRATE 50 UG/ML
25-50 INJECTION, SOLUTION INTRAMUSCULAR; INTRAVENOUS EVERY 5 MIN PRN
Status: DISCONTINUED | OUTPATIENT
Start: 2017-08-28 | End: 2017-08-28 | Stop reason: HOSPADM

## 2017-08-28 RX ORDER — SODIUM CHLORIDE, SODIUM LACTATE, POTASSIUM CHLORIDE, CALCIUM CHLORIDE 600; 310; 30; 20 MG/100ML; MG/100ML; MG/100ML; MG/100ML
INJECTION, SOLUTION INTRAVENOUS CONTINUOUS PRN
Status: DISCONTINUED | OUTPATIENT
Start: 2017-08-28 | End: 2017-08-28

## 2017-08-28 RX ORDER — SODIUM CHLORIDE, SODIUM LACTATE, POTASSIUM CHLORIDE, CALCIUM CHLORIDE 600; 310; 30; 20 MG/100ML; MG/100ML; MG/100ML; MG/100ML
INJECTION, SOLUTION INTRAVENOUS CONTINUOUS
Status: DISCONTINUED | OUTPATIENT
Start: 2017-08-28 | End: 2017-08-29 | Stop reason: HOSPADM

## 2017-08-28 RX ORDER — FENTANYL CITRATE 50 UG/ML
INJECTION, SOLUTION INTRAMUSCULAR; INTRAVENOUS PRN
Status: DISCONTINUED | OUTPATIENT
Start: 2017-08-28 | End: 2017-08-28

## 2017-08-28 RX ORDER — BUPIVACAINE HYDROCHLORIDE 2.5 MG/ML
INJECTION, SOLUTION INFILTRATION; PERINEURAL PRN
Status: DISCONTINUED | OUTPATIENT
Start: 2017-08-28 | End: 2017-08-28 | Stop reason: HOSPADM

## 2017-08-28 RX ADMIN — ACETAMINOPHEN 650 MG: 325 TABLET, FILM COATED ORAL at 06:58

## 2017-08-28 RX ADMIN — FENTANYL CITRATE 25 MCG: 50 INJECTION, SOLUTION INTRAMUSCULAR; INTRAVENOUS at 18:45

## 2017-08-28 RX ADMIN — FENTANYL CITRATE 50 MCG: 50 INJECTION, SOLUTION INTRAMUSCULAR; INTRAVENOUS at 18:51

## 2017-08-28 RX ADMIN — FENTANYL CITRATE 50 MCG: 50 INJECTION, SOLUTION INTRAMUSCULAR; INTRAVENOUS at 18:58

## 2017-08-28 RX ADMIN — GABAPENTIN 100 MG: 100 CAPSULE ORAL at 10:11

## 2017-08-28 RX ADMIN — FENTANYL CITRATE 50 MCG: 50 INJECTION, SOLUTION INTRAMUSCULAR; INTRAVENOUS at 19:08

## 2017-08-28 RX ADMIN — Medication 0.2 MG: at 22:54

## 2017-08-28 RX ADMIN — BUPIVACAINE HYDROCHLORIDE 10 ML: 2.5 INJECTION, SOLUTION INFILTRATION; PERINEURAL at 18:32

## 2017-08-28 RX ADMIN — HYDROMORPHONE HYDROCHLORIDE 0.5 MG: 1 INJECTION, SOLUTION INTRAMUSCULAR; INTRAVENOUS; SUBCUTANEOUS at 20:04

## 2017-08-28 RX ADMIN — FENTANYL CITRATE 75 MCG: 50 INJECTION, SOLUTION INTRAMUSCULAR; INTRAVENOUS at 17:39

## 2017-08-28 RX ADMIN — SODIUM CHLORIDE, POTASSIUM CHLORIDE, SODIUM LACTATE AND CALCIUM CHLORIDE: 600; 310; 30; 20 INJECTION, SOLUTION INTRAVENOUS at 18:40

## 2017-08-28 RX ADMIN — FENTANYL CITRATE 50 MCG: 50 INJECTION, SOLUTION INTRAMUSCULAR; INTRAVENOUS at 18:49

## 2017-08-28 RX ADMIN — HYDROMORPHONE HYDROCHLORIDE 0.3 MG: 1 INJECTION, SOLUTION INTRAMUSCULAR; INTRAVENOUS; SUBCUTANEOUS at 19:37

## 2017-08-28 RX ADMIN — OXYCODONE HYDROCHLORIDE 10 MG: 5 TABLET ORAL at 11:01

## 2017-08-28 RX ADMIN — KETOROLAC TROMETHAMINE 30 MG: 30 INJECTION, SOLUTION INTRAMUSCULAR at 18:35

## 2017-08-28 RX ADMIN — FENTANYL CITRATE 50 MCG: 50 INJECTION, SOLUTION INTRAMUSCULAR; INTRAVENOUS at 19:03

## 2017-08-28 RX ADMIN — SODIUM CHLORIDE, POTASSIUM CHLORIDE, SODIUM LACTATE AND CALCIUM CHLORIDE: 600; 310; 30; 20 INJECTION, SOLUTION INTRAVENOUS at 17:33

## 2017-08-28 RX ADMIN — HYDROMORPHONE HYDROCHLORIDE 0.4 MG: 1 INJECTION, SOLUTION INTRAMUSCULAR; INTRAVENOUS; SUBCUTANEOUS at 19:54

## 2017-08-28 RX ADMIN — PROPOFOL 100 MG: 10 INJECTION, EMULSION INTRAVENOUS at 17:41

## 2017-08-28 RX ADMIN — OXYCODONE HYDROCHLORIDE 10 MG: 5 TABLET ORAL at 06:58

## 2017-08-28 RX ADMIN — OXYCODONE HYDROCHLORIDE 10 MG: 5 TABLET ORAL at 20:14

## 2017-08-28 RX ADMIN — ACETAMINOPHEN 650 MG: 325 TABLET, FILM COATED ORAL at 11:01

## 2017-08-28 RX ADMIN — HYDROXYZINE HYDROCHLORIDE 25 MG: 25 TABLET ORAL at 20:31

## 2017-08-28 RX ADMIN — CEFAZOLIN 2 G: 1 INJECTION, POWDER, FOR SOLUTION INTRAMUSCULAR; INTRAVENOUS at 18:11

## 2017-08-28 RX ADMIN — ACETAMINOPHEN 650 MG: 325 TABLET, FILM COATED ORAL at 02:55

## 2017-08-28 RX ADMIN — LIDOCAINE HYDROCHLORIDE 40 MG: 20 INJECTION, SOLUTION INFILTRATION; PERINEURAL at 17:39

## 2017-08-28 RX ADMIN — HYDROMORPHONE HYDROCHLORIDE 0.3 MG: 1 INJECTION, SOLUTION INTRAMUSCULAR; INTRAVENOUS; SUBCUTANEOUS at 19:23

## 2017-08-28 RX ADMIN — ONDANSETRON 4 MG: 2 INJECTION INTRAMUSCULAR; INTRAVENOUS at 18:34

## 2017-08-28 RX ADMIN — ACETAMINOPHEN 650 MG: 325 TABLET, FILM COATED ORAL at 20:12

## 2017-08-28 RX ADMIN — GABAPENTIN 100 MG: 100 CAPSULE ORAL at 14:10

## 2017-08-28 RX ADMIN — MIDAZOLAM HYDROCHLORIDE 2 MG: 1 INJECTION, SOLUTION INTRAMUSCULAR; INTRAVENOUS at 17:33

## 2017-08-28 RX ADMIN — PROPOFOL 200 MG: 10 INJECTION, EMULSION INTRAVENOUS at 17:39

## 2017-08-28 RX ADMIN — GABAPENTIN 100 MG: 100 CAPSULE ORAL at 19:57

## 2017-08-28 RX ADMIN — FENTANYL CITRATE 50 MCG: 50 INJECTION, SOLUTION INTRAMUSCULAR; INTRAVENOUS at 19:13

## 2017-08-28 RX ADMIN — OXYCODONE HYDROCHLORIDE 10 MG: 5 TABLET ORAL at 02:55

## 2017-08-28 ASSESSMENT — ENCOUNTER SYMPTOMS: SEIZURES: 0

## 2017-08-28 NOTE — PLAN OF CARE
Problem: Goal Outcome Summary  Goal: Goal Outcome Summary  Pt A/O X 4. VSS. Sleep: normal. Lungs- clear/equal bilaterally. IS encouraged.  PP2+ DP2+. CMS and Neuro's are intact. Denies numbness and tingling in all extremities. Denies nausea, shortness of breath, and chest pain. Has pain in the right knee and given PRN Tylenol and PRN Oxycodoen, ICE applied, and is tolerating well. Voids spontaneously without difficulty in the bathroom. Is on a NPO diet for bone biopsy today. Bowels- audible/active in all four quadrants, is passing flatus, last BM 8/27. Pt up standby assist with walker or crutches. Pt is able to make needs known and the call light is within the pt's reach. Patient is wanting to use the wipes to clean up before surgery today. Continue to monitor.

## 2017-08-28 NOTE — ANESTHESIA CARE TRANSFER NOTE
Patient: Ariel Triplett    Procedure(s):  Open Biopsy Right Distal Femur  - Wound Class: I-Clean    Diagnosis: Right Distal Femur Mass   Diagnosis Additional Information: No value filed.    Anesthesia Type:   General, LMA     Note:  Airway :Face Mask  Patient transferred to:PACU        Vitals: (Last set prior to Anesthesia Care Transfer)    CRNA VITALS  8/28/2017 1821 - 8/28/2017 1856      8/28/2017             Pulse: 111    SpO2: 100 %    Resp Rate (observed): 16    Resp Rate (set): 10    EKG: Sinus rhythm                Electronically Signed By: YVONNE Martinez CRNA  August 28, 2017  6:56 PM

## 2017-08-28 NOTE — BRIEF OP NOTE
[facility] Brief Operative Note    Pre-operative diagnosis: Right Distal Femur Mass    Post-operative diagnosis same   Procedure: Procedure(s):  Open Biopsy Right Distal Femur  - Wound Class: I-Clean   Surgeon(s): Surgeon(s) and Role:     * Neymar Landers MD - Primary     * Dralene Lane PA-C - Assisting     * Corrina Thakur MD - Resident - Assisting     * Mi Mendez MD - Resident - Assisting   Estimated blood loss: 10mL    Specimens:   ID Type Source Tests Collected by Time Destination   1 : right distal femur Tissue Leg, Right ANAEROBIC BACTERIAL CULTURE, TISSUE CULTURE AEROBIC BACTERIAL Neymar Landers MD 8/28/2017  6:09 PM    A : biopsy right distal femur Tissue Leg, Right SURGICAL PATHOLOGY EXAM Neymar Landers MD 8/28/2017  6:11 PM       Findings: None    Post-op Plan:  WB status: FWBAT with crutches prn for comfort  Device:  none  DVT Prophylaxis:  Not needed   Follow-up:  Call with results

## 2017-08-28 NOTE — PROGRESS NOTES
Orthopaedic Surgery Progress Note 08/28/2017    E: No acute events overnight.    S: Pain well controlled. No change in quality or severity of leg pain. Ambulated with assistance of crutches yesterday. Tolerating PO, voiding. NPO after midnight. Denies numbness or tingling. No fever, aches or chills.    O:  Temp: 96  F (35.6  C) Temp src: Oral BP: 122/67 Pulse: 75   Resp: 16 SpO2: 97 % O2 Device: None (Room air)      Exam:  Gen: No acute distress, resting comfortably in bed.  Resp: Non-labored breathing  MSk - RLE: Full range of motion in hip and ankle with minimal pain, decreased range of motion and knee joint to 80  of flexion secondary to pain,  5/5 TA GSC EHL FHL, SILT throughout foot, 2+ dp pulse    Recent Labs  Lab 08/24/17  0636 08/23/17  2127   WBC 6.1  --    HGB 13.9  --      --    CRP  --  117.0*     Assessment: Ariel Triplett is a 22-year-old male transferred from outside with right knee pain and imaging concerning for neoplasm vs osteomyelitis. Inpatient for pain control and decreased mobility, plan for OR today for open biopsy right distal femur.    Plan:  Orthopedic Primary  Activity: Up with assist.  Weight bearing status: WBAT  Antibiotics: No pre-op antibiotics   Diet: NPO at midnight for planned surgery on 08/28  DVT prophylaxis: lovenox, hold for OR on 08/28  Pain management: Scheduled tylenol and prn oxycodone    Physical Therapy: mobility  Consults: Internal medecine    Plan: Open biopsy of right distal femur with Dr. Landers Monday at 5:00 pm    BREANNE Hou MD 08/28/2017  Orthopaedic Surgery Resident, PGY-1  Pager: [phone]

## 2017-08-28 NOTE — PLAN OF CARE
Problem: Individualization  Goal: Patient Preferences  Pt A/O X 4. Afebrile. VSS. Lungs- clear bilaterally with both anterior and posterior. IS encouraged. Bowels- active in all four quadrants. CMS and Neuro's are intact. Denies numbness and tingling in all extremities. Denies nausea, shortness of breath, and chest pain. Has pain in the R knee radiating to the hip and ankles and given prn tylenol and oxycodone, ICE applied, and is tolerating well. Voids spontaneously without difficulty in the toilet. Is on a regular diet and appetite was good this shift. Pt walked in hallway with stand by assist using crutches, uses walker in room for mobility, encouraged mobility and dorsiflexion to prevent DVT's. Pt is pre op for procedure 8/28/17 scheduled at 1700 per ortho notes. Pt. received 1st shower with chlorhexidine/antimicrobial soap, linens changed, and to be NPO at midnight.  Pt is able to make needs known and the call light is within the pt's reach. Continue to monitor.

## 2017-08-28 NOTE — PROGRESS NOTES
"Focus: Patients status  DB: Patient up in halls with crutches. Pt complaining of pain in right knee that is controlled with oral pain medications.Pt also has redness in right eye.   I: Patient was assessed for pain meds and status.Patient applied a warm pack to left eye to help with redness.   E: Patient right lower leg is slightly swollen. However patient states\" It has gotten much better.\" Pt states\" the pain medications keep my pain right at a 6. I am able to rest but the pain never goes away.\" Status of patient will continue to be monitored.    "

## 2017-08-29 ENCOUNTER — AMBULATORY - GICH (OUTPATIENT)
Dept: SCHEDULING | Facility: OTHER | Age: 22
End: 2017-08-29

## 2017-08-29 ENCOUNTER — APPOINTMENT (OUTPATIENT)
Dept: GENERAL RADIOLOGY | Facility: CLINIC | Age: 22
End: 2017-08-29
Attending: NURSE PRACTITIONER
Payer: MEDICAID

## 2017-08-29 VITALS
OXYGEN SATURATION: 93 % | HEIGHT: 69 IN | BODY MASS INDEX: 27.53 KG/M2 | TEMPERATURE: 98 F | WEIGHT: 185.9 LBS | RESPIRATION RATE: 16 BRPM | DIASTOLIC BLOOD PRESSURE: 74 MMHG | HEART RATE: 82 BPM | SYSTOLIC BLOOD PRESSURE: 117 MMHG

## 2017-08-29 PROCEDURE — 25000132 ZZH RX MED GY IP 250 OP 250 PS 637: Performed by: INTERNAL MEDICINE

## 2017-08-29 PROCEDURE — 25000128 H RX IP 250 OP 636: Performed by: PHYSICIAN ASSISTANT

## 2017-08-29 PROCEDURE — 73552 X-RAY EXAM OF FEMUR 2/>: CPT | Mod: RT

## 2017-08-29 PROCEDURE — 25000132 ZZH RX MED GY IP 250 OP 250 PS 637: Performed by: NURSE PRACTITIONER

## 2017-08-29 PROCEDURE — 25000132 ZZH RX MED GY IP 250 OP 250 PS 637: Performed by: PHYSICIAN ASSISTANT

## 2017-08-29 PROCEDURE — 25000132 ZZH RX MED GY IP 250 OP 250 PS 637: Performed by: STUDENT IN AN ORGANIZED HEALTH CARE EDUCATION/TRAINING PROGRAM

## 2017-08-29 RX ORDER — IBUPROFEN 600 MG/1
600 TABLET, FILM COATED ORAL EVERY 6 HOURS
Status: DISCONTINUED | OUTPATIENT
Start: 2017-08-29 | End: 2017-08-29 | Stop reason: HOSPADM

## 2017-08-29 RX ORDER — OXYCODONE HYDROCHLORIDE 5 MG/1
5-10 TABLET ORAL EVERY 4 HOURS PRN
Qty: 80 TABLET | Refills: 0 | Status: SHIPPED | OUTPATIENT
Start: 2017-08-29 | End: 2023-02-10

## 2017-08-29 RX ORDER — OXYCODONE HYDROCHLORIDE 5 MG/1
5-10 TABLET ORAL EVERY 4 HOURS PRN
Qty: 80 TABLET | Refills: 0 | Status: SHIPPED | OUTPATIENT
Start: 2017-08-29 | End: 2017-08-29

## 2017-08-29 RX ORDER — HYDROXYZINE HYDROCHLORIDE 25 MG/1
25 TABLET, FILM COATED ORAL EVERY 6 HOURS PRN
Qty: 60 TABLET | Refills: 1 | Status: SHIPPED | OUTPATIENT
Start: 2017-08-29 | End: 2017-09-13

## 2017-08-29 RX ORDER — GABAPENTIN 100 MG/1
100 CAPSULE ORAL 3 TIMES DAILY
Qty: 90 CAPSULE | Refills: 1 | Status: SHIPPED | OUTPATIENT
Start: 2017-08-29 | End: 2023-02-10

## 2017-08-29 RX ORDER — HYDROMORPHONE HYDROCHLORIDE 2 MG/1
2-4 TABLET ORAL EVERY 4 HOURS PRN
Status: DISCONTINUED | OUTPATIENT
Start: 2017-08-29 | End: 2017-08-29 | Stop reason: HOSPADM

## 2017-08-29 RX ORDER — IBUPROFEN 600 MG/1
600 TABLET, FILM COATED ORAL EVERY 6 HOURS
Qty: 28 TABLET | Refills: 0 | Status: SHIPPED | OUTPATIENT
Start: 2017-08-29 | End: 2017-09-05

## 2017-08-29 RX ORDER — OXYCODONE HYDROCHLORIDE 5 MG/1
5 TABLET ORAL EVERY 4 HOURS PRN
Qty: 80 TABLET | Refills: 0 | Status: SHIPPED | OUTPATIENT
Start: 2017-08-29 | End: 2017-08-29

## 2017-08-29 RX ORDER — HYDROMORPHONE HYDROCHLORIDE 2 MG/1
2-4 TABLET ORAL EVERY 4 HOURS PRN
Qty: 80 TABLET | Refills: 0 | Status: SHIPPED | OUTPATIENT
Start: 2017-08-29 | End: 2017-09-13

## 2017-08-29 RX ORDER — ACETAMINOPHEN 325 MG/1
975 TABLET ORAL EVERY 8 HOURS
Status: DISCONTINUED | OUTPATIENT
Start: 2017-08-29 | End: 2017-08-29 | Stop reason: HOSPADM

## 2017-08-29 RX ADMIN — ACETAMINOPHEN 650 MG: 325 TABLET, FILM COATED ORAL at 16:54

## 2017-08-29 RX ADMIN — GABAPENTIN 100 MG: 100 CAPSULE ORAL at 14:29

## 2017-08-29 RX ADMIN — OXYCODONE HYDROCHLORIDE 10 MG: 5 TABLET ORAL at 12:41

## 2017-08-29 RX ADMIN — ACETAMINOPHEN 650 MG: 325 TABLET, FILM COATED ORAL at 04:38

## 2017-08-29 RX ADMIN — ACETAMINOPHEN 650 MG: 325 TABLET, FILM COATED ORAL at 12:41

## 2017-08-29 RX ADMIN — HYDROXYZINE HYDROCHLORIDE 25 MG: 25 TABLET ORAL at 10:05

## 2017-08-29 RX ADMIN — IBUPROFEN 600 MG: 600 TABLET ORAL at 14:29

## 2017-08-29 RX ADMIN — ACETAMINOPHEN 650 MG: 325 TABLET, FILM COATED ORAL at 08:35

## 2017-08-29 RX ADMIN — Medication 0.2 MG: at 07:54

## 2017-08-29 RX ADMIN — HYDROMORPHONE HYDROCHLORIDE 4 MG: 2 TABLET ORAL at 14:29

## 2017-08-29 RX ADMIN — OXYCODONE HYDROCHLORIDE 10 MG: 5 TABLET ORAL at 08:35

## 2017-08-29 RX ADMIN — HYDROXYZINE HYDROCHLORIDE 25 MG: 25 TABLET ORAL at 16:54

## 2017-08-29 RX ADMIN — OXYCODONE HYDROCHLORIDE 10 MG: 5 TABLET ORAL at 00:27

## 2017-08-29 RX ADMIN — GABAPENTIN 100 MG: 100 CAPSULE ORAL at 07:56

## 2017-08-29 RX ADMIN — ACETAMINOPHEN 650 MG: 325 TABLET, FILM COATED ORAL at 00:27

## 2017-08-29 RX ADMIN — OXYCODONE HYDROCHLORIDE 10 MG: 5 TABLET ORAL at 04:38

## 2017-08-29 NOTE — PLAN OF CARE
Problem: Goal Outcome Summary  Goal: Goal Outcome Summary  Outcome: No Change      VS:    VSS; on RA   Output:    Adequate output; LBM 8/28.   Activity:    1A with crutches. Pt cannot bear any weight on RLE.   Skin: Intact except for biopsy site.   Pain:    Managed with PO dilaudid, vistaril and tylenol.   CMS:    Entire R foot numb; MD notified. PP, cap reful <3 sec. 1+ edema R foot/ankle.   Dressing:    Aquacel CDI   Diet:    Tolerating regular diet   Equipment:    Crutches   Plan:    DC this evening   Additional Info:    Pt went down for XR. When pt came back to [facility], MD notified that pt crying in pain, R foot numb, and cannot bear weight. XR came back negative. With time, pt's pain calmed down, and pt decided he wanted to leave. Pt was given medication and DC instructions and states no further questions at this time.

## 2017-08-29 NOTE — PLAN OF CARE
Problem: Goal Outcome Summary  Goal: Goal Outcome Summary  Outcome: No Change  VS:  VSS, A&O to 4 makes his needs known.  Denies chest pain or SOB     Pt very irritated with capnography stating it is causing him to not sleep. Education provided, Pt continued to refuse and placed on pulse oximetry,  Pt now sleeping well    Output:  Adequate output; voiding spontaneously in urinal    Activity:  SBA with crutches. Independent  with repositioning    Pain:  C/o pain  to anterior knee,  Ice applied and PRN oxycodone and APAP have been effective    CMS:  Intact   Dressing:  CDI   Diet:  Tolerating regular diet; sips water through the night   LDA:  IV patent and  Infusing    Equipment:  crutches   Plan:  Continue current plan of care.

## 2017-08-29 NOTE — PROGRESS NOTES
"O.Still with pain post biopsy. Minimally changed since admission. To d/c today. \"Wants something to make it numb.\" Needs assistance to dress.   A: minimal swelling, biopsy site, dressed, ddi  R. Add ibuprofen, change to oral dilaudid, schedule tylenol. ICE, elevate. Pt advised to contact clinic at end of the week for biopsy results, and plan.  "

## 2017-08-29 NOTE — PROGRESS NOTES
I accepted the patient as a transfer for workup of the distal femoral lesion.  I discussed the patient with my partner, Dr. Castillo, who will care for patient during admission.  Plan for biopsy when OR time available with Dr. Landers.

## 2017-08-29 NOTE — PLAN OF CARE
Problem: Goal Outcome Summary  Goal: Goal Outcome Summary  Patient A/Ox4. VSS. Denies CP, SOB, dizziness/LH. LSCTA. +fl/BS. Voiding per urinal. CMS intact. Dressing (ACE wrap) loosened to knee CDI, ice applied. Tolerating regular diet  IS encouraged.  PIV removed  This AM. Pain rated tolerable throughout shift, Roxicodone 10 mg po and tylenol not helping. Yina Fernandez notified, came and assessed patient . New orders received, pain meds given.  Patient has demonstrated ability to call appropriately. Patient is resting with call light within reach. Will continue to monitor.

## 2017-08-29 NOTE — OR NURSING
Pt came out of OR writhing in bed, c/o 10/10 pain.  After a lot of pain medications and ice paick, Pt cont to c/o pain to right knee, 7-8/10 which pt states is intolerable.  Now c/o itching.  Spoke with ASHELY Sutherland, Vistaril ordered and Dilaudid ordered for breakthrough pain on floor.

## 2017-08-29 NOTE — PROGRESS NOTES
Care Coordinator Progress Note     Admission Date/Time:  8/23/2017  Attending MD:  Neymar Landers*     Data  Chart reviewed, discussed with interdisciplinary team.   Patient was admitted for: Acute pain of right knee.    Concerns with insurance coverage for discharge needs: None.  Current Living Situation: Patient lives with adult . (roomates)  Support System: Supportive  Services Involved: None prior to admission  Transportation: [facility] Co: [employer] (Ph: [phone])  Barriers to Discharge: transportation    Coordination of Care and Referrals: Provided patient/family with options for Rides.        Assessment    I met with the patient at the bedside to introduce the role of the care coordinator and to assist with discharge planning as the patient is medically ready to discharge today. The patient previously stated that he did not have a ride home to [place]: We discussed that he is medically ready to leave the hospital, and that he should begin arranging transportation with his friends/roomates. The patient states that his mother would be unable to pick him up.    I checked with this patient's insurance (he has new Medicaid effective 7/1/17 per financial). The patient does have a ride benefit through [facility] (per [facility] staff). Per [facility], they contract with [employer] (Ph: [phone]). When I informed the patient that I could arrange a ride for him through his insurance, the patient stated that he would prefer to obtain a ride from his roomates. I asked him to update me on his pickup time as soon as possible so I could coordinate a ride if necessary.     12:30- The patient stated that his roommate Rich would be picking him up and that he began driving to \[facility]\"" at approx 12:00. The patient estimates that his roommate Rich will be at the hospital by 4pm. Bedside and Charge RN aware.      Plan  Anticipated Discharge Date:  08/29/17  Anticipated Discharge Plan:  Home  with assist    Enriqueta Lai, RN Care Coordinator covering for Sophy Bonner, NIKI Care Coordinator    Ph: [phone]  Pager: [phone]

## 2017-08-29 NOTE — PROGRESS NOTES
Orthopaedic Surgery Progress Note 08/29/2017    E: No acute events overnight.    S: Pain well controlled.  Ambulated with assistance of crutches yesterday. Tolerating PO, voiding. Denies numbness or tingling. No fever, aches or chills.    O:  Temp: 98  F (36.7  C) Temp src: Oral BP: 113/69 Pulse: 109 Heart Rate: 77 Resp: 14 SpO2: 93 % O2 Device: None (Room air) Oxygen Delivery: 8 LPM    Exam:  Gen: No acute distress, resting comfortably in bed.  Resp: Non-labored breathing  MSk - RLE:dressing c/d/i, 5/5 TA GSC EHL FHL, SILT throughout foot, 2+ dp pulse    Recent Labs  Lab 08/24/17  0636 08/23/17  2127   WBC 6.1  --    HGB 13.9  --      --    CRP  --  117.0*     Assessment: Ariel Triplett is a 22-year-old male transferred from outside with right knee pain and imaging concerning for neoplasm vs osteomyelitis. S/p open biopsy right distal femur 8/28    Plan:  Orthopedic Primary  Activity: Up ad roxie  Weight bearing status: WBAT  DVT prophylaxis: lovenox, will DC on mechanical ppx  Pain management: Scheduled tylenol and prn oxycodone    Physical Therapy: mobility  Consults: Internal medecine    DC home today pending transport    Mi Daugherty MD 08/29/2017  Orthopaedic Surgery Resident, PGY-4  Pager: [phone]

## 2017-08-29 NOTE — PROGRESS NOTES
PACU to Inpatient Nursing Handoff    Patient Ariel rTiplett is a 22 year old male who speaks English.   Procedure Procedure(s):  Open Biopsy Right Distal Femur  - Wound Class: I-Clean   Surgeon(s) Primary: Neymar Landers MD  Assisting: Darlene Lane PA-C  Resident - Assisting: Corrina Thakur MD; Mi Mendez MD     No Known Allergies    Isolation  No active isolations    Past Medical History   has no past medical history on file.    Anesthesia General   Dermatome Level     Preop Meds Versed - time given: 1733   Nerve block Not applicable   Intraop Meds fentanyl (Sublimaze):  200 mcg total  ketorolac (Toradol): last given at 1835  ondansetron (Zofran): last given at 1834   Local Meds Yes   Antibiotics cefazolin (Ancef) - last given at 1811     Pain Patient Currently in Pain: yes  Comfort: tolerable with discomfort  Pain Control: partially effective   PACU meds  acetaminophen (Tylenol): 650 mg (total dose) last given at 2012   fentanyl (Sublimaze): 200 mcg (total dose) last given at 1913   hydromorphone (Dilaudid): 1.5 mg (total dose) last given at 2004   hydroxizine (Vistaril/Atarax): 25 mg (total dose) last given at 2031   oxycodone (Roxicodone): 10 mg (total dose) last given at 2014   Gabapentin: given at 1957   PCA / epidural No   Capnography     Telemetry ECG Rhythm: Normal sinus rhythm      Labs Glucose Lab Results   Component Value Date    GLC 90 08/24/2017       Hgb Lab Results   Component Value Date    HGB 13.9 08/24/2017       INR Lab Results   Component Value Date    INR 1.04 08/24/2017      PACU Imaging Not applicable     Wound/Incision Incision/Surgical Site 08/28/17 Right;Lateral Knee (Active)   Incision Assessment UTV 8/28/2017  7:23 PM   Elizabet-Incision Assessment UTV 8/28/2017  7:23 PM   Incision Drainage Amount None 8/28/2017  7:23 PM   Dressing Intervention Clean, dry, intact 8/28/2017  7:23 PM   Number of days:0      CMS All Extremities Temperature: warm  All  Extremities Color: no discoloration  All Extremities Sensation: no numbness;no tingling  RLE Temperature: warm  RLE Color: no discoloration  RLE Sensation: numbness present   Equipment ice pack   Other LDA       IV Access Peripheral IV 08/28/17 Right Hand (Active)   Site Assessment WDL;WDL except;Leaking 8/28/2017  7:15 PM   Line Status Infusing 8/28/2017  7:15 PM   Phlebitis Scale 0-->no symptoms 8/28/2017  7:15 PM   Infiltration Scale 0 8/28/2017  7:15 PM   Dressing Intervention New dressing  8/28/2017  7:15 PM   Number of days:0      Blood Products Not applicable EBL surg log * No blood loss amount entered *   Intake/Output Date 08/28/17 0700 - 08/29/17 0659   Shift 5655-0201 7896-1297 0198-3231 24 Hour Total   I  N  T  A  K  E   P.O.  480  480    I.V.  1100  1100    Shift Total  (mL/kg)  1580  (18.74)  1580  (18.74)   O  U  T  P  U  T   Blood  3  3    Shift Total  (mL/kg)  3  (0.04)  3  (0.04)   Weight (kg) 84.32 84.32 84.32 84.32        Drains / Hayes     Time of void PreOp Void Prior to Procedure: 1400 (08/28/17 1630)    PostOp Urine Occurrence: 1 (up to bathroom after walking in halls) (08/28/17 1303)   Bladder Scan      mL (08/28/17 2015)  tolerating sips and crackers     Vitals    B/P: 130/79  T: 98.9  F (37.2  C)    Temp src: Oral  P:  Pulse: 109 (08/28/17 1900)    Heart Rate: 102 (08/28/17 2030)     R: 15  O2:  SpO2: 98 %    O2 Device: None (Room air)    Oxygen Delivery: 8 LPM         Family/support present no   Patient belongings Patient Belongings: none  Disposition of Belongings: in pts room on 8th floor   Patient transported on cart   DC meds/scripts (obs/outpt) Not applicable     Special needs/considerations None   Tasks needing completion None       Alix Fay RN  ASCOM *[id number]

## 2017-08-29 NOTE — ANESTHESIA POSTPROCEDURE EVALUATION
Patient: Ariel Triplett    Procedure(s):  Open Biopsy Right Distal Femur  - Wound Class: I-Clean    Diagnosis:Right Distal Femur Mass   Diagnosis Additional Information: No value filed.    Anesthesia Type:  General, LMA    Note:  Anesthesia Post Evaluation    Patient location during evaluation: PACU  Patient participation: Able to fully participate in evaluation  Level of consciousness: awake and alert  Pain management: adequate  Airway patency: patent  Cardiovascular status: acceptable  Respiratory status: acceptable  Hydration status: acceptable  PONV: none     Anesthetic complications: None          Last vitals:  Vitals:    08/28/17 1945 08/28/17 2000 08/28/17 2015   BP: 122/74 129/72 126/78   Pulse:      Resp: 19 17 8   Temp:      SpO2: 98% 95% 93%         Electronically Signed By: Hilaria Lind MD  August 28, 2017  8:30 PM

## 2017-08-29 NOTE — PLAN OF CARE
"Problem: Perioperative Period (Adult)  Goal: Signs and Symptoms of Listed Potential Problems Will be Absent or Manageable (Perioperative Period)  Signs and symptoms of listed potential problems will be absent or manageable by discharge/transition of care (reference Perioperative Period (Adult) CPG).   Outcome: Improving            VS:    Stable.   Output:    Pt hasn't voided yet since came up to surgery.   Activity:     WBAT. Pt refused to dangle at beside. Stated that \"  I want to wait till later.\"   Skin: Intact.   Pain:    Pt took pain all his pain meds before coming to the unit. Pt is resting comfortably.   CMS:    Has numbness on right foot due to surgery.   Dressing:    ACE dressing on right knee CDI.   Diet:    On regular diet and tolerated well.   LDA:    PIV infusing LD at 100 ml/hr into right hand.   Equipment:    Capno, IV pole,    Plan:    TBD.   Additional Info:                      "

## 2017-08-30 LAB — COPATH REPORT: NORMAL

## 2017-08-30 NOTE — OP NOTE
DATE OF SURGERY: 8/28/2017    PREOPERATIVE DIAGNOSIS:  Right femur lesion    POSTOPERATIVE DIAGNOSIS: Right femur lesion    PROCEDURE: Open biopsy right femur    SURGEON: Neymar Landers MD     ASSISTANT:    * Corrina Thakur MD - Resident - Assisting     * Darlene Lane PA-C - Assisting     * Mi Mendez MD - Resident - Assisting    PATIENT HISTORY: This patient had severe pain in his right distal femur.  Imaging showed a lot of inflammation and a poorly defined lesion.  X-ray showed obvious lysis.  The patient is aware the risks of bleeding infection.  Numbness tingling stiffness and weakness.    DESCRIPTION OF PROCEDURE: The patient was taken to the operating room in the supine position and underwent successful induction of general anesthesia.  The right leg was washed and sterilely prepped and draped.  An incision laterally over the distal femur sharply through skin and subcutaneous tissues tissue with cautery.  I split the IT band and then carefully reflected the soft tissues off the femur taking care not to open the synovium and get into the joint.  We also avoided the origin of the lateral collateral ligament.  The bone was thin in one spot that we discovered and I was able to pass into easily with a curette.  I sent some samples to pathology.  There seemed to be some cavities within the bone that was falling into that were rather small but there was not an obvious abnormal soft tissue component here.  After sending samples for pathology and cultures I irrigated and placed some bone wax and Gelfoam at the site to stop bone bleeding.  Vicryl was used close the tensor fascia and subcutaneous tissue.  Monocryl sutures and skin closed with Steri-Strips and a sterile dry dressing.  The patient was extubated and taken to the recovery room in stable condition.  The estimated blood loss is 10 mL there were no complications.  I was present for all portions of the  procedure.    Neymar Landers MD

## 2017-09-01 LAB — COPATH REPORT: NORMAL

## 2017-09-02 LAB
BACTERIA SPEC CULT: NO GROWTH
SPECIMEN SOURCE: NORMAL

## 2017-09-04 LAB
BACTERIA SPEC CULT: NORMAL
Lab: NORMAL
SPECIMEN SOURCE: NORMAL

## 2017-09-06 LAB — COPATH REPORT: NORMAL

## 2017-09-07 ENCOUNTER — TELEPHONE (OUTPATIENT)
Dept: ORTHOPEDICS | Facility: CLINIC | Age: 22
End: 2017-09-07

## 2017-09-07 DIAGNOSIS — M86.9 OSTEOMYELITIS (H): Primary | ICD-10-CM

## 2017-09-07 NOTE — TELEPHONE ENCOUNTER
Patient has an appointment with Dr. Crys Hernandez at [facility] in [place] on  09-12-17 at 1345.    [address] in the [facility].    This has been cancelled, pt prefers to go to [place].      patient calling you back  Received: Today       Darlene Dueñas RN Chapman-Shultz, Dixie, RN                     Patient called and stated he was supposed to call you back with an ID# [id number].  He did not have a fax number but did give a phone number for the infectious disease clinic [phone].       I told him you would call him if we needed anything else from him.              Fax: [phone]

## 2017-09-07 NOTE — TELEPHONE ENCOUNTER
Patient called to give information on [facility] who needs his records from here.  The ID# they gave was [id number].  He did not have a fax number but did give a phone number for their clinic [phone].  Information will be sent to CYNDY Nam.  Patient told that if she needed anything more she would call him.

## 2017-09-07 NOTE — TELEPHONE ENCOUNTER
RN called and spoke with Bora.  Pathology reviewed showed  Patient Name: BORA KOHLER   MR#: [id number]   Specimen #: [id number]   Collected: 8/28/2017   Received: 8/29/2017   Reported: 9/1/2017 15:32   Ordering Phy(s): SUREKHA HILL     For improved result formatting, select 'View Enhanced Report Format'   under Linked Documents section.     SPECIMEN(S):   Bone biopsy right distal femur     FINAL DIAGNOSIS:   Bone, distal femur, curettage:   - Osteomyelitis   - See comment   Plan:  See Infectious disease, no surgical treatment, treatment is antibiotics.  Bora is unsure of who put him on antibiotics.  Wants treatment locally. Non found in [place], [place], however, [facility] in [place], INfectious disease  [address]   [place] [address] [phone]   Infectious Diseases  RN called and left message to call back to schedule a consult with this patient.  RN again spoke with the patient, he states that a family member now wants him to go to [place] for treatment.  Patient instructed to call and call back with information and we will facilitate records to [place], with his ID number and who to send to.  Patient states that he will do this.

## 2017-09-12 ENCOUNTER — TELEPHONE (OUTPATIENT)
Dept: ORTHOPEDICS | Facility: CLINIC | Age: 22
End: 2017-09-12

## 2017-09-12 NOTE — TELEPHONE ENCOUNTER
Pt calls for refill of atarax and dilaudid. States he has been out for at least one day of the dilaudid and has been unable to get off the couch. Pt is s/p open biopsy right femur on 08/28/17. Pt lives in [place] and needs Rx mailed to his home. Message forwarded to Viv for follow up

## 2017-09-13 DIAGNOSIS — M25.561 ACUTE PAIN OF RIGHT KNEE: ICD-10-CM

## 2017-09-13 RX ORDER — HYDROXYZINE HYDROCHLORIDE 25 MG/1
25 TABLET, FILM COATED ORAL EVERY 6 HOURS PRN
Qty: 30 TABLET | Refills: 1 | Status: SHIPPED | OUTPATIENT
Start: 2017-09-13 | End: 2023-02-10

## 2017-09-13 RX ORDER — HYDROMORPHONE HYDROCHLORIDE 2 MG/1
2-4 TABLET ORAL EVERY 4 HOURS PRN
Qty: 80 TABLET | Refills: 0 | Status: SHIPPED | OUTPATIENT
Start: 2017-09-13 | End: 2023-02-10

## 2017-09-13 NOTE — TELEPHONE ENCOUNTER
Patient is waiting to go to [place] for treatment.  He is asking for a refill on his pain medicaiton.  MO/ Isaac Landers we will refill meds.

## 2017-09-14 ENCOUNTER — TELEPHONE (OUTPATIENT)
Dept: ORTHOPEDICS | Facility: CLINIC | Age: 22
End: 2017-09-14

## 2017-09-14 NOTE — TELEPHONE ENCOUNTER
Late entry     09-07-17. RN faxed over pathology report, clinic notes, referral to ID, labs to fax: [phone]

## 2017-09-14 NOTE — TELEPHONE ENCOUNTER
Rn calling [facility] on 09-14-17.  Spoke with Ermias at [phone], infectious disease clinic.  She states that the only way that the patient can be seen is to be referred by [facility].  She will help connect with Ortho department  [id number].    RN then talked with Corrina in the Ortho/infectious Disease department.  Refax reports, labs, pathology to Fax: [phone]. They will review with MD's to see who should see this patient and call the patient.  We will also push imaging into PACS's      Referral to [facility] for I&D  Received: Yesterday       Zena Delaney RN Chapman-Shultz, Dixie, RN                     Here is the info Cody gave me to give to you. Apparently the  said they need to talk with you because he would not know some of the information they needed to facilitate the appointment.   [facility] # [phone]. His patient ID# is [id number].   I told him you were busy seeing pt's and would not get to it until tomorrow.     Zena

## 2017-09-21 ENCOUNTER — MEDICAL CORRESPONDENCE (OUTPATIENT)
Dept: HEALTH INFORMATION MANAGEMENT | Facility: CLINIC | Age: 22
End: 2017-09-21

## 2017-09-22 ENCOUNTER — TELEPHONE (OUTPATIENT)
Dept: ORTHOPEDICS | Facility: CLINIC | Age: 22
End: 2017-09-22

## 2017-09-22 NOTE — TELEPHONE ENCOUNTER
Ariel is calling and states that [facility] denied him, he needs to go elsewhere now.  RN will fax over referral and records to [facility] in [place]    We will seek appt with Dr. Crys Hernandez     [address] [facility] [facility] in the [facility].

## 2017-10-06 ENCOUNTER — TELEPHONE (OUTPATIENT)
Dept: ORTHOPEDICS | Facility: CLINIC | Age: 22
End: 2017-10-06

## 2017-10-06 ENCOUNTER — AMBULATORY - GICH (OUTPATIENT)
Dept: SCHEDULING | Facility: OTHER | Age: 22
End: 2017-10-06

## 2017-10-06 DIAGNOSIS — M86.9 OSTEOMYELITIS (H): Primary | ICD-10-CM

## 2017-10-06 NOTE — TELEPHONE ENCOUNTER
Dr. Landers received a call asking for records to be faxed to ID at fax: [phone].  RN faxed records

## 2017-12-22 ENCOUNTER — AMBULATORY - GICH (OUTPATIENT)
Dept: SCHEDULING | Facility: OTHER | Age: 22
End: 2017-12-22

## 2017-12-27 ENCOUNTER — AMBULATORY - GICH (OUTPATIENT)
Dept: SCHEDULING | Facility: OTHER | Age: 22
End: 2017-12-27

## 2018-01-31 ENCOUNTER — DOCUMENTATION ONLY (OUTPATIENT)
Dept: FAMILY MEDICINE | Facility: OTHER | Age: 23
End: 2018-01-31

## 2019-08-22 NOTE — PROGRESS NOTES
Orthopaedic Surgery Progress Note 08/24/2017    E: No acute events overnight.    S: Pain well controlled with tylenol and oxycodone. Denies numbness or tingling. No fever, aches or chills. Plan of care reviewed and questions answered.     O:  Temp: 97.9  F (36.6  C) Temp src: Oral BP: 136/74 Pulse: 72   Resp: 16 SpO2: 96 % O2 Device: None (Room air)      Exam:  Gen: No acute distress, resting comfortably in bed.  Resp: Non-labored breathing  MSk - LLE: Full range of motion in hip and ankle with minimal pain, decreased range of motion and knee joint to 80  of flexion secondary to pain, appreciable knee effusion with suprapatellar pouch swelling, increased temperature compared to contralateral knee, tenderness over lateral joint line and into popliteal fossa of knee, distal extremity neurovascularly intact      Recent Labs  Lab 08/24/17  0636 08/23/17  2127   WBC 6.1  --    HGB 13.9  --      --    CRP  --  117.0*       Assessment: Ariel Triplett is a 22-year-old male transferred from outside hospital with 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  Weight bearing status: WBAT  Antibiotics: No pre-op antibiotics   Diet: NPO at midnight   DVT prophylaxis: mechanical prior to surgery .  Pain management: Scheduled tylenol and prn oxycodone    Physical Therapy: ROM, ADL's.  Labs: Follow inflammatory labs   Consults: Internal medecine for comanagemet with plans to have biopsy tomorrow     * Formal read of MRI of knee from outside hospital requested     Plan: Open biopsy of distal femur with Dr. Landers Friday at 7:00-7:30am     BREANNE Hou MD 08/24/2017  Orthopaedic Surgery Resident, PGY-1  Pager: [phone]     no

## 2019-09-06 ENCOUNTER — HOSPITAL ENCOUNTER (OUTPATIENT)
Dept: CT IMAGING | Facility: OTHER | Age: 24
Discharge: HOME | End: 2019-09-06
Attending: ORTHOPAEDIC SURGERY | Admitting: FAMILY MEDICINE
Payer: COMMERCIAL

## 2019-09-06 DIAGNOSIS — S52.572A CLOSED DIE PUNCH FRACTURE DISTAL RADIUS, LEFT, INITIAL ENCOUNTER: ICD-10-CM

## 2019-09-06 PROCEDURE — 73200 CT UPPER EXTREMITY W/O DYE: CPT | Mod: LT

## 2019-09-17 ENCOUNTER — HOSPITAL ENCOUNTER (EMERGENCY)
Facility: OTHER | Age: 24
Discharge: HOME | End: 2019-09-17
Attending: FAMILY MEDICINE | Admitting: FAMILY MEDICINE
Payer: COMMERCIAL

## 2019-09-17 VITALS
OXYGEN SATURATION: 94 % | DIASTOLIC BLOOD PRESSURE: 77 MMHG | SYSTOLIC BLOOD PRESSURE: 123 MMHG | BODY MASS INDEX: 27.4 KG/M2 | TEMPERATURE: 97.4 F | WEIGHT: 185 LBS | HEART RATE: 101 BPM | HEIGHT: 69 IN | RESPIRATION RATE: 16 BRPM

## 2019-09-17 DIAGNOSIS — R58 HEMORRHAGE: ICD-10-CM

## 2019-09-17 PROCEDURE — 99283 EMERGENCY DEPT VISIT LOW MDM: CPT | Mod: Z6 | Performed by: FAMILY MEDICINE

## 2019-09-17 PROCEDURE — 99283 EMERGENCY DEPT VISIT LOW MDM: CPT | Performed by: FAMILY MEDICINE

## 2019-09-17 ASSESSMENT — MIFFLIN-ST. JEOR: SCORE: 1809.53

## 2019-09-17 NOTE — ED AVS SNAPSHOT
[facility]  [address]  [place] [address]  Phone:  [phone]  Fax:  [phone]                                    Ariel Triplett   MRN: [id number]    Department:  [facility]   Date of Visit:  9/17/2019           After Visit Summary Signature Page    I have received my discharge instructions, and my questions have been answered. I have discussed any challenges I see with this plan with the nurse or doctor.    ..........................................................................................................................................  Patient/Patient Representative Signature      ..........................................................................................................................................  Patient Representative Print Name and Relationship to Patient    ..................................................               ................................................  Date                                   Time    ..........................................................................................................................................  Reviewed by Signature/Title    ...................................................              ..............................................  Date                                               Time          22EPIC Rev 08/18

## 2019-09-18 NOTE — ED PROVIDER NOTES
"  History     Chief Complaint   Patient presents with     Post-op Problem     HPI  Ariel Triplett is a 24 year old male who had an orthopedic procedure today to his left forearm.  He evidently had forearm fx and had plates placed to stabilize the bones.  Splint was placed, but unfortunately, there has been bleeding which has soaked the entire splint including the ace wrap and his sling.        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    Social History:  Marital Status:  Single [1]  Social History     Tobacco Use     Smoking status: Not on file   Substance Use Topics     Alcohol use: Yes     Alcohol/week: 0.0 oz     Comment: Alcoholic Drinks/day: once/week     Drug use: Unknown     Types: Other     Comment: Drug use: No        Medications:      acetaminophen (TYLENOL) 325 MG tablet   gabapentin (NEURONTIN) 100 MG capsule   HYDROmorphone (DILAUDID) 2 MG tablet   hydrOXYzine (ATARAX) 25 MG tablet   oxyCODONE (ROXICODONE) 5 MG IR tablet   senna-docusate (SENOKOT-S;PERICOLACE) 8.6-50 MG per tablet         Review of Systems  No dizziness or lightheaded.  Physical Exam   BP: (!) 155/84  Pulse: 101  Temp: 98.5  F (36.9  C)  Resp: 16  Height: 175.3 cm (5' 9\")  Weight: 83.9 kg (185 lb)  SpO2: 98 %      Physical Exam well man.  Stable vitals.  He indeed has a soaked splint and padding.  All the way into his sling.         I carefully removed " the padding, the sling, and the splint.  I had an assistant hold the hand in a neutral position and there was no movement to the forearm.  At the suture site, there are 3 areas of active oozing.  Sutures have not come loose, but there is rather significant oozing.      I used silver nitrate to these spots as well as dermabond.  Hemostasis was achieved.  It was allowed to dry, and then an adaptik non-stick dressing placed, and then the padding, and then the splint, and finally some more padding and ace wrap.      ED Course        Procedures               Critical Care time:  none               No results found for this or any previous visit (from the past 24 hour(s)).    Medications - No data to display    Assessments & Plan (with Medical Decision Making)     I have reviewed the nursing notes.    I have reviewed the findings, diagnosis, plan and need for follow up with the patient.   the patient had a very elaborate padding to his fingers that I was not able to reproduce.      My recommendation for him is to return to that orthopedic office tomorrow.  The splint is not replaced tonight, is somewhat red but is dry.  It was placed back on his volar forearm in the same position.      He will likely have the entire splint re-done tomorrow to incorporate the elaborate finger padding/immobilization.  I feel we were extremely careful with the forearm and had an assistant at all times keeping it immobile.      The area is now dry and hemostasis was achieved.    All questions answered.    New Prescriptions    No medications on file       Final diagnoses:   Hemorrhage       9/17/2019   [facility]  09/17/19 5590

## 2019-09-18 NOTE — ED NOTES
Dr. Crump given silver nitrate to stop bleeding, small amount, to surgical site, which otherwise looks intact with staples

## 2020-08-29 ENCOUNTER — HOSPITAL ENCOUNTER (EMERGENCY)
Facility: OTHER | Age: 25
Discharge: HOME | End: 2020-08-29
Attending: PHYSICIAN ASSISTANT | Admitting: PHYSICIAN ASSISTANT
Payer: COMMERCIAL

## 2020-08-29 VITALS
SYSTOLIC BLOOD PRESSURE: 129 MMHG | WEIGHT: 147.4 LBS | TEMPERATURE: 96.8 F | HEART RATE: 90 BPM | DIASTOLIC BLOOD PRESSURE: 65 MMHG | BODY MASS INDEX: 21.83 KG/M2 | RESPIRATION RATE: 18 BRPM | HEIGHT: 69 IN | OXYGEN SATURATION: 97 %

## 2020-08-29 DIAGNOSIS — J35.1 SWOLLEN TONSIL: ICD-10-CM

## 2020-08-29 DIAGNOSIS — R59.0 ANTERIOR CERVICAL ADENOPATHY: ICD-10-CM

## 2020-08-29 DIAGNOSIS — J35.8 CALCULUS OF TONSIL: ICD-10-CM

## 2020-08-29 PROCEDURE — 25000125 ZZHC RX 250: Performed by: PHYSICIAN ASSISTANT

## 2020-08-29 PROCEDURE — 99282 EMERGENCY DEPT VISIT SF MDM: CPT | Mod: Z6 | Performed by: PHYSICIAN ASSISTANT

## 2020-08-29 PROCEDURE — 99283 EMERGENCY DEPT VISIT LOW MDM: CPT | Performed by: PHYSICIAN ASSISTANT

## 2020-08-29 RX ORDER — LIDOCAINE HYDROCHLORIDE 20 MG/ML
10 SOLUTION OROPHARYNGEAL ONCE
Status: COMPLETED | OUTPATIENT
Start: 2020-08-29 | End: 2020-08-29

## 2020-08-29 RX ORDER — MULTIPLE VITAMINS W/ MINERALS TAB 9MG-400MCG
1 TAB ORAL DAILY
COMMUNITY

## 2020-08-29 RX ADMIN — LIDOCAINE HYDROCHLORIDE 10 ML: 20 SOLUTION ORAL; TOPICAL at 17:53

## 2020-08-29 ASSESSMENT — ENCOUNTER SYMPTOMS
ACTIVITY CHANGE: 0
FATIGUE: 0
DIARRHEA: 0
CHILLS: 0
CONFUSION: 0
LIGHT-HEADEDNESS: 0
DIZZINESS: 0
BRUISES/BLEEDS EASILY: 0
WEAKNESS: 0
FACIAL SWELLING: 0
VOMITING: 0
FREQUENCY: 0
SEIZURES: 0
NECK PAIN: 0
BACK PAIN: 0
WOUND: 0
SORE THROAT: 0
TROUBLE SWALLOWING: 0
APPETITE CHANGE: 0
EYE PAIN: 0
HEADACHES: 0
FEVER: 0
HEMATURIA: 0
NAUSEA: 0
DYSURIA: 0
COUGH: 0
ADENOPATHY: 0
VOICE CHANGE: 0
ABDOMINAL PAIN: 0
SHORTNESS OF BREATH: 0
SINUS PRESSURE: 0
CHEST TIGHTNESS: 0

## 2020-08-29 ASSESSMENT — MIFFLIN-ST. JEOR: SCORE: 1633.98

## 2020-08-29 NOTE — ED AVS SNAPSHOT
[facility]  [address]  [place] [address]  Phone:  [phone]  Fax:  [phone]                                    Ariel Triplett   MRN: [id number]    Department:  [facility]   Date of Visit:  8/29/2020           After Visit Summary Signature Page    I have received my discharge instructions, and my questions have been answered. I have discussed any challenges I see with this plan with the nurse or doctor.    ..........................................................................................................................................  Patient/Patient Representative Signature      ..........................................................................................................................................  Patient Representative Print Name and Relationship to Patient    ..................................................               ................................................  Date                                   Time    ..........................................................................................................................................  Reviewed by Signature/Title    ...................................................              ..............................................  Date                                               Time          22EPIC Rev 08/18

## 2020-08-29 NOTE — ED TRIAGE NOTES
"Lump in throat, recently in [place] and, was not there 3 days ago, swollen, stiff, has had tonsil stones in the past, but nothing like this. \"its a solid chunk, it feels solid\" when tapped makes him want to vomit. Able to eat and drink, denies any difficulty breathing. Increased stress.  Rash/bite on right arm.   "

## 2020-08-29 NOTE — ED PROVIDER NOTES
History     Chief Complaint   Patient presents with     Mass     Rash     HPI  Ariel Triplett is a 25 year old male who has had some swollen tonsils and tonsillar stones in the past.  He recently came back from [place] and noticed significant swelling to his right tonsil with tenderness that he can palpate into his neck.  Denies any fever or chills.  When he tries to touch this area it makes him nauseated but denies any emesis.  He is here for further evaluation at this time.  He is somewhat upset over the fact that the emergency room has been busy and he had to wait almost 3 hours to be seen.    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 No pertinent family history.    Social History:  Marital Status:  Single [1]  Social History     Tobacco Use     Smoking status: Current Some Day Smoker     Smokeless tobacco: Never Used   Substance Use Topics     Alcohol use: Yes     Alcohol/week: 0.0 standard drinks     Comment: Alcoholic Drinks/day: once/week     Drug use: Unknown     Types: Other     Comment: Drug use: No        Medications:    amoxicillin-clavulanate (AUGMENTIN) 875-125 MG tablet  multivitamin w/minerals (MULTI-VITAMIN) tablet  acetaminophen (TYLENOL) 325 MG tablet  gabapentin (NEURONTIN) 100 MG capsule  HYDROmorphone (DILAUDID) 2 MG tablet  hydrOXYzine (ATARAX) 25 MG tablet  oxyCODONE (ROXICODONE) 5 MG IR tablet  senna-docusate  "(SENOKOT-S;PERICOLACE) 8.6-50 MG per tablet          Review of Systems   Constitutional: Negative for activity change, appetite change, chills, fatigue and fever.   HENT: Negative for congestion, facial swelling, sinus pressure, sore throat, trouble swallowing and voice change.         Tenderness to palpation over his right neck area with some cervical adenopathy noted   Eyes: Negative for pain and visual disturbance.   Respiratory: Negative for cough, chest tightness and shortness of breath.    Cardiovascular: Negative for chest pain.   Gastrointestinal: Negative for abdominal pain, diarrhea, nausea and vomiting.   Genitourinary: Negative for dysuria, frequency, hematuria and urgency.   Musculoskeletal: Negative for back pain and neck pain.   Skin: Negative for rash and wound.   Neurological: Negative for dizziness, seizures, syncope, weakness, light-headedness and headaches.   Hematological: Negative for adenopathy. Does not bruise/bleed easily.   Psychiatric/Behavioral: Negative for confusion.       Physical Exam   BP: 129/65  Pulse: 90  Temp: 98.2  F (36.8  C)  Resp: 18  Height: 175.3 cm (5' 9\")  Weight: 66.9 kg (147 lb 6.4 oz)(triage standing scale)  SpO2: 97 %      Physical Exam  Constitutional:       General: He is not in acute distress.     Appearance: He is not ill-appearing, toxic-appearing or diaphoretic.   HENT:      Head: Atraumatic.      Right Ear: Tympanic membrane normal. No drainage or tenderness.      Left Ear: Tympanic membrane normal. No drainage or tenderness.      Nose: Nose normal. No rhinorrhea.      Mouth/Throat:      Pharynx: Uvula midline. Pharyngeal swelling and posterior oropharyngeal erythema present. No uvula swelling.      Tonsils: 3+ on the right. 1+ on the left.        Comments: Right tonsillar swelling noted with erythema.  Eyes:      General: No scleral icterus.     Extraocular Movements: Extraocular movements intact.      Right eye: Normal extraocular motion and no nystagmus.     "  Left eye: Normal extraocular motion and no nystagmus.      Pupils: Pupils are equal, round, and reactive to light. Pupils are equal.      Funduscopic exam:     Right eye: No AV nicking or papilledema. Red reflex present.         Left eye: No AV nicking or papilledema. Red reflex present.  Neck:      Musculoskeletal: Normal range of motion. No neck rigidity, pain with movement or muscular tenderness.      Vascular: No JVD.      Trachea: No tracheal deviation.        Comments: Right neck tenderness to palpation with some noted cervical adenopathy.  Cardiovascular:      Rate and Rhythm: Normal rate and regular rhythm.      Heart sounds: Normal heart sounds. No friction rub.   Pulmonary:      Effort: No respiratory distress.      Breath sounds: Normal breath sounds. No stridor.   Abdominal:      General: Bowel sounds are normal.      Palpations: Abdomen is soft.      Tenderness: There is no abdominal tenderness. There is no guarding or rebound.   Musculoskeletal: Normal range of motion.         General: No tenderness.   Lymphadenopathy:      Cervical: Cervical adenopathy present.      Right cervical: Superficial cervical adenopathy and deep cervical adenopathy present.      Left cervical: No superficial cervical adenopathy.   Skin:     General: Skin is warm.      Capillary Refill: Capillary refill takes less than 2 seconds.      Findings: No rash.   Neurological:      General: No focal deficit present.      Mental Status: He is alert and oriented to person, place, and time.         ED Course        No results found for this or any previous visit (from the past 24 hour(s)).    Medications   lidocaine (XYLOCAINE) 2 % solution 10 mL (10 mLs Mouth/Throat Given 8/29/20 0343)       Assessments & Plan (with Medical Decision Making)     I have reviewed the nursing notes.    I have reviewed the findings, diagnosis, plan and need for follow up with the patient.      New Prescriptions    AMOXICILLIN-CLAVULANATE (AUGMENTIN)  875-125 MG TABLET    Take 1 tablet by mouth 2 times daily       Final diagnoses:   Calculus of tonsil   Swollen tonsil   Anterior cervical adenopathy     Afebrile.  Vital signs stable.  Right swollen tonsil on examination with some cervical adenopathy.  Appears to be a tonsillar stone which may be lodged.  I discussed using salt water gargles as well as lemon drops to help increase secretions.  Rx for Augmentin x10 days.  He was given viscous lidocaine orally in the ER for pain.  If no improvement within 3-5 days and he needs will follow-up with Dr. Ramirez the ENT at [facility].  Follow-up sooner if there is any other concerns problems or questions.    8/29/2020   [facility]     Malcom Bruner PA-C  08/29/20 1810       Malcom Bruner PA-C  08/29/20 1811

## 2020-10-31 ENCOUNTER — ALLIED HEALTH/NURSE VISIT (OUTPATIENT)
Dept: FAMILY MEDICINE | Facility: OTHER | Age: 25
End: 2020-10-31
Attending: FAMILY MEDICINE
Payer: COMMERCIAL

## 2020-10-31 DIAGNOSIS — R05.9 COUGH: Primary | ICD-10-CM

## 2020-10-31 PROCEDURE — 99207 PR NO CHARGE NURSE ONLY: CPT

## 2020-10-31 PROCEDURE — U0003 INFECTIOUS AGENT DETECTION BY NUCLEIC ACID (DNA OR RNA); SEVERE ACUTE RESPIRATORY SYNDROME CORONAVIRUS 2 (SARS-COV-2) (CORONAVIRUS DISEASE [COVID-19]), AMPLIFIED PROBE TECHNIQUE, MAKING USE OF HIGH THROUGHPUT TECHNOLOGIES AS DESCRIBED BY CMS-2020-01-R: HCPCS | Mod: ZL | Performed by: FAMILY MEDICINE

## 2020-10-31 PROCEDURE — C9803 HOPD COVID-19 SPEC COLLECT: HCPCS | Performed by: FAMILY MEDICINE

## 2020-11-02 LAB
SARS-COV-2 RNA SPEC QL NAA+PROBE: NOT DETECTED
SPECIMEN SOURCE: NORMAL

## 2020-11-03 ENCOUNTER — TELEPHONE (OUTPATIENT)
Dept: FAMILY MEDICINE | Facility: OTHER | Age: 25
End: 2020-11-03

## 2020-11-03 NOTE — TELEPHONE ENCOUNTER
Reason for call: Request for results.    Name of test or procedure: covid    Date of test or procedure: 10/31/20    Location of test or procedure: [facility]    Preferred method for responding to this message: Telephone Call    Phone number patient can be reached at: Other phone number:  [phone]    If we can't reach you directly, may we leave a detailed response at the number you provided?Yes

## 2020-11-03 NOTE — TELEPHONE ENCOUNTER
"Attempted to contact patient at number [phone]. Person who answered states, \" No he is not available. I will have to have him call you back\".     November 3, 2020    Ariel Triplett  [address]  [place] [address]             COVID-19 Virus PCR to [facility] - Result (no units)   Date Value   10/31/2020 Not Detected            This letter provides a written record that you were tested for COVID-19.     Your result was negative. This means that we didn t find the virus that causes COVID-19 in your sample. A test may show negative when you do actually have the virus. This can happen when the virus is in the early stages of infection, before you feel illness symptoms.     If you have symptoms   Stay home and away from others (self-isolate) until you meet ALL of the guidelines below:    You ve had no fever--and no medicine that reduces fever--for 1 full day (24 hours). And      Your other symptoms have gotten better. For example, your cough or breathing has improved. And     At least 10 days have passed since your symptoms started. (If you've been told by a doctor that you have a weak immune system, wait 20 days)     During this time:    Stay home. Don t go to work, school or anywhere else.     Stay in your own room, including for meals. Use your own bathroom if you can.    Stay away from others in your home. No hugging, kissing or shaking hands. No visitors.    Clean  high touch  surfaces often (doorknobs, counters, handles, etc.). Use a household cleaning spray or wipes. You can find a full list on the EPA website at www.epa.gov/pesticide-registration/list-n-disinfectants-use-against-sars-cov-2.    Cover your mouth and nose with a mask or other face covering to avoid spreading germs.    Wash your hands and face often with soap and water.     Going back to work  Check with your employer for any guidelines to follow for going back to work.     Employers: This document serves as formal notice that your employee " tested negative for COVID-19, as of the testing date shown above.

## 2020-11-03 NOTE — TELEPHONE ENCOUNTER
"See note below. Please refer to 11/03/2020 \"Results\" telephone encounter.  Patient is not available at number given [phone]-per person who answered the phone.  Maria Luisa Pascal RN on 11/3/2020 at 2:51 PM    "

## 2020-11-05 NOTE — TELEPHONE ENCOUNTER
Patient notified of results.  See other telephone note  Earline Malagon RN on 11/5/2020 at 9:58 AM

## 2020-11-05 NOTE — TELEPHONE ENCOUNTER
Called and spoke with patient   Patient informed of negative COVID results and verbalized understanding.    Earline Malagon RN on 11/5/2020 at 9:56 AM

## 2020-12-07 ENCOUNTER — RESULTS ONLY (OUTPATIENT)
Dept: LAB | Age: 25
End: 2020-12-07

## 2020-12-07 PROCEDURE — C9803 HOPD COVID-19 SPEC COLLECT: HCPCS

## 2020-12-09 LAB
SARS-COV-2 RNA SPEC QL NAA+PROBE: NOT DETECTED
SPECIMEN SOURCE: NORMAL

## 2020-12-18 ENCOUNTER — MEDICAL CORRESPONDENCE (OUTPATIENT)
Dept: HEALTH INFORMATION MANAGEMENT | Facility: OTHER | Age: 25
End: 2020-12-18

## 2020-12-18 ENCOUNTER — RESULTS ONLY (OUTPATIENT)
Dept: LAB | Age: 25
End: 2020-12-18

## 2020-12-20 LAB
SARS-COV-2 RNA SPEC QL NAA+PROBE: NOT DETECTED
SPECIMEN SOURCE: NORMAL

## 2020-12-30 ENCOUNTER — RESULTS ONLY (OUTPATIENT)
Dept: LAB | Age: 25
End: 2020-12-30

## 2020-12-30 ENCOUNTER — MEDICAL CORRESPONDENCE (OUTPATIENT)
Dept: HEALTH INFORMATION MANAGEMENT | Facility: OTHER | Age: 25
End: 2020-12-30

## 2021-01-01 LAB
SARS-COV-2 RNA SPEC QL NAA+PROBE: NOT DETECTED
SPECIMEN SOURCE: NORMAL

## 2021-01-20 ENCOUNTER — RESULTS ONLY (OUTPATIENT)
Dept: LAB | Age: 26
End: 2021-01-20

## 2021-01-21 LAB
LABORATORY COMMENT REPORT: NORMAL
SARS-COV-2 RNA RESP QL NAA+PROBE: NEGATIVE
SARS-COV-2 RNA RESP QL NAA+PROBE: NORMAL
SPECIMEN SOURCE: NORMAL
SPECIMEN SOURCE: NORMAL

## 2021-02-11 ENCOUNTER — MEDICAL CORRESPONDENCE (OUTPATIENT)
Dept: HEALTH INFORMATION MANAGEMENT | Facility: OTHER | Age: 26
End: 2021-02-11

## 2021-02-11 ENCOUNTER — RESULTS ONLY (OUTPATIENT)
Dept: LAB | Age: 26
End: 2021-02-11

## 2021-04-05 ENCOUNTER — HOSPITAL ENCOUNTER (EMERGENCY)
Facility: OTHER | Age: 26
Discharge: HOME | End: 2021-04-05
Attending: PHYSICIAN ASSISTANT | Admitting: PHYSICIAN ASSISTANT
Payer: COMMERCIAL

## 2021-04-05 VITALS
RESPIRATION RATE: 20 BRPM | WEIGHT: 156 LBS | HEART RATE: 72 BPM | OXYGEN SATURATION: 96 % | DIASTOLIC BLOOD PRESSURE: 72 MMHG | TEMPERATURE: 98.6 F | HEIGHT: 69 IN | SYSTOLIC BLOOD PRESSURE: 116 MMHG | BODY MASS INDEX: 23.11 KG/M2

## 2021-04-05 DIAGNOSIS — M79.604 PAIN OF RIGHT LOWER EXTREMITY: ICD-10-CM

## 2021-04-05 DIAGNOSIS — T24.201A SECOND DEGREE BURN OF RIGHT LOWER EXTREMITY EXCEPT ANKLE AND FOOT, INITIAL ENCOUNTER: ICD-10-CM

## 2021-04-05 PROCEDURE — 99282 EMERGENCY DEPT VISIT SF MDM: CPT | Performed by: PHYSICIAN ASSISTANT

## 2021-04-05 PROCEDURE — 99283 EMERGENCY DEPT VISIT LOW MDM: CPT | Performed by: PHYSICIAN ASSISTANT

## 2021-04-05 RX ORDER — SILVER SULFADIAZINE 10 MG/G
CREAM TOPICAL
Qty: 400 G | Refills: 1 | Status: SHIPPED | OUTPATIENT
Start: 2021-04-05 | End: 2021-04-19

## 2021-04-05 RX ORDER — HYDROCODONE BITARTRATE AND ACETAMINOPHEN 5; 325 MG/1; MG/1
1 TABLET ORAL EVERY 6 HOURS PRN
Qty: 10 TABLET | Refills: 0 | Status: SHIPPED | OUTPATIENT
Start: 2021-04-05 | End: 2021-08-18 | Stop reason: ALTCHOICE

## 2021-04-05 ASSESSMENT — ENCOUNTER SYMPTOMS
CONFUSION: 0
SHORTNESS OF BREATH: 0
CHEST TIGHTNESS: 0
FEVER: 0
BRUISES/BLEEDS EASILY: 0
HEMATURIA: 0
WOUND: 1
BACK PAIN: 0
ABDOMINAL PAIN: 0
ADENOPATHY: 0
CHILLS: 0

## 2021-04-05 ASSESSMENT — MIFFLIN-ST. JEOR: SCORE: 1672.99
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
## 2021-05-26 ENCOUNTER — HOSPITAL ENCOUNTER (EMERGENCY)
Facility: OTHER | Age: 26
Discharge: HOME | End: 2021-05-26
Attending: FAMILY MEDICINE | Admitting: FAMILY MEDICINE
Payer: COMMERCIAL

## 2021-05-26 ENCOUNTER — APPOINTMENT (OUTPATIENT)
Dept: GENERAL RADIOLOGY | Facility: OTHER | Age: 26
End: 2021-05-26
Attending: FAMILY MEDICINE
Payer: COMMERCIAL

## 2021-05-26 VITALS
BODY MASS INDEX: 22.22 KG/M2 | RESPIRATION RATE: 16 BRPM | TEMPERATURE: 96.8 F | OXYGEN SATURATION: 98 % | DIASTOLIC BLOOD PRESSURE: 69 MMHG | SYSTOLIC BLOOD PRESSURE: 130 MMHG | WEIGHT: 150 LBS | HEIGHT: 69 IN | HEART RATE: 61 BPM

## 2021-05-26 DIAGNOSIS — M25.561 RIGHT KNEE PAIN, UNSPECIFIED CHRONICITY: ICD-10-CM

## 2021-05-26 DIAGNOSIS — M54.50 MIDLINE LOW BACK PAIN WITHOUT SCIATICA, UNSPECIFIED CHRONICITY: ICD-10-CM

## 2021-05-26 LAB
ANION GAP SERPL CALCULATED.3IONS-SCNC: 8 MMOL/L (ref 3–14)
BASOPHILS # BLD AUTO: 0.1 10E9/L (ref 0–0.2)
BASOPHILS NFR BLD AUTO: 1 %
BUN SERPL-MCNC: 14 MG/DL (ref 7–25)
CALCIUM SERPL-MCNC: 9.1 MG/DL (ref 8.6–10.3)
CHLORIDE SERPL-SCNC: 108 MMOL/L (ref 98–107)
CO2 SERPL-SCNC: 28 MMOL/L (ref 21–31)
CREAT SERPL-MCNC: 0.98 MG/DL (ref 0.7–1.3)
CRP SERPL-MCNC: 1 MG/L
DIFFERENTIAL METHOD BLD: NORMAL
EOSINOPHIL # BLD AUTO: 0.2 10E9/L (ref 0–0.7)
EOSINOPHIL NFR BLD AUTO: 3 %
ERYTHROCYTE [DISTWIDTH] IN BLOOD BY AUTOMATED COUNT: 12.6 % (ref 10–15)
GFR SERPL CREATININE-BSD FRML MDRD: >90 ML/MIN/{1.73_M2}
GLUCOSE SERPL-MCNC: 113 MG/DL (ref 70–105)
HCT VFR BLD AUTO: 43.9 % (ref 40–53)
HGB BLD-MCNC: 15.1 G/DL (ref 13.3–17.7)
LYMPHOCYTES # BLD AUTO: 1.8 10E9/L (ref 0.8–5.3)
LYMPHOCYTES NFR BLD AUTO: 36 %
MCH RBC QN AUTO: 30.9 PG (ref 26.5–33)
MCHC RBC AUTO-ENTMCNC: 34.4 G/DL (ref 31.5–36.5)
MCV RBC AUTO: 90 FL (ref 78–100)
MONOCYTES # BLD AUTO: 0.5 10E9/L (ref 0–1.3)
MONOCYTES NFR BLD AUTO: 10 %
NEUTROPHILS # BLD AUTO: 2.5 10E9/L (ref 1.6–8.3)
NEUTROPHILS NFR BLD AUTO: 50 %
PLATELET # BLD AUTO: 244 10E9/L (ref 150–450)
POTASSIUM SERPL-SCNC: 3.9 MMOL/L (ref 3.5–5.1)
RBC # BLD AUTO: 4.88 10E12/L (ref 4.4–5.9)
SODIUM SERPL-SCNC: 144 MMOL/L (ref 134–144)
WBC # BLD AUTO: 5.1 10E9/L (ref 4–11)

## 2021-05-26 PROCEDURE — 80048 BASIC METABOLIC PNL TOTAL CA: CPT | Performed by: FAMILY MEDICINE

## 2021-05-26 PROCEDURE — 72080 X-RAY EXAM THORACOLMB 2/> VW: CPT

## 2021-05-26 PROCEDURE — 99284 EMERGENCY DEPT VISIT MOD MDM: CPT | Mod: 25 | Performed by: FAMILY MEDICINE

## 2021-05-26 PROCEDURE — 86140 C-REACTIVE PROTEIN: CPT | Performed by: FAMILY MEDICINE

## 2021-05-26 PROCEDURE — 99283 EMERGENCY DEPT VISIT LOW MDM: CPT | Performed by: FAMILY MEDICINE

## 2021-05-26 PROCEDURE — 73562 X-RAY EXAM OF KNEE 3: CPT | Mod: RT

## 2021-05-26 PROCEDURE — 36415 COLL VENOUS BLD VENIPUNCTURE: CPT | Performed by: FAMILY MEDICINE

## 2021-05-26 PROCEDURE — 85025 COMPLETE CBC W/AUTO DIFF WBC: CPT | Performed by: FAMILY MEDICINE

## 2021-05-26 RX ORDER — KETOROLAC TROMETHAMINE 10 MG/1
10 TABLET, FILM COATED ORAL EVERY 6 HOURS PRN
Qty: 20 TABLET | Refills: 0 | Status: SHIPPED | OUTPATIENT
Start: 2021-05-26 | End: 2023-02-10

## 2021-05-26 ASSESSMENT — MIFFLIN-ST. JEOR: SCORE: 1645.78

## 2021-05-26 NOTE — ED TRIAGE NOTES
"ED Nursing Triage Note (General)   ________________________________    Ariel Triplett is a 25 year old Male that presents to triage private car  With history of  Rt knee pain and back pain that started about 3 days ago reported by patient. Pt has a history of osteomyelitis in the Rt femur 2 years ago and is concerned that the pain is similar.  Significant symptoms had onset 3 day(s) ago.  /69   Pulse 61   Temp 96.8  F (36  C) (Tympanic)   Resp 16   Ht 1.753 m (5' 9\")   Wt 68 kg (150 lb)   SpO2 98%   BMI 22.15 kg/m  t  Patient appears alert  and oriented, in no acute distress., and cooperative and pleasant behavior.  GCS Total = 15  Airway: intact  Breathing noted as Normal  Circulation Normal  Skin:  Normal  Action taken: placed in waiting room      PRE HOSPITAL PRIOR LIVING SITUATION Other Relatives  "

## 2021-05-26 NOTE — ED PROVIDER NOTES
History     Chief Complaint   Patient presents with     Knee Pain     Rt knee     Back Pain     HPI  Ariel Triplett is a 25 year old male with history of previous osteomyelitis who presents with right knee pain that is similar to previous osteomyelitis.  He also is having some low back pain.  No fevers or chills.    Reviewed RN notes below, same history relayed to me     ED Nursing Triage Note (General)   ________________________________     Ariel Triplett is a 25 year old Male that presents to triage private car  With history of  Rt knee pain and back pain that started about 3 days ago reported by patient. Pt has a history of osteomyelitis in the Rt femur 2 years ago and is concerned that the pain is similar.  Significant symptoms had onset 3 day(s) ago.       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 No pertinent family history.    Social History:  Marital Status:  Single [1]  Social History     Tobacco Use     Smoking status: Current Some Day Smoker     Smokeless tobacco: Never Used   Substance Use Topics     Alcohol use: Yes     Alcohol/week: 0.0 standard drinks     Comment: Alcoholic Drinks/day: once/week     Drug use: Unknown     Types: Other     Comment: Drug use: No        Medications:    acetaminophen (TYLENOL) 325 MG tablet  ketorolac (TORADOL) 10 MG  "tablet  multivitamin w/minerals (MULTI-VITAMIN) tablet  gabapentin (NEURONTIN) 100 MG capsule  HYDROcodone-acetaminophen (NORCO) 5-325 MG tablet  HYDROmorphone (DILAUDID) 2 MG tablet  hydrOXYzine (ATARAX) 25 MG tablet  oxyCODONE (ROXICODONE) 5 MG IR tablet  senna-docusate (SENOKOT-S;PERICOLACE) 8.6-50 MG per tablet          Review of Systems  No fevers chills or shakes no nausea vomiting diarrhea  Physical Exam   BP: 130/69  Pulse: 61  Temp: 96.8  F (36  C)  Resp: 16  Height: 175.3 cm (5' 9\")  Weight: 68 kg (150 lb)  SpO2: 98 %      Physical Exam  Vitals signs and nursing note reviewed.   Cardiovascular:      Rate and Rhythm: Normal rate.   Musculoskeletal:         General: Tenderness present.      Right knee: He exhibits normal range of motion, no swelling and no effusion. Tenderness found.      Lumbar back: He exhibits tenderness, pain and spasm. He exhibits no bony tenderness.   Neurological:      Mental Status: He is alert.       Alert and oriented, nontoxic in appearance.  ED Course        Procedures             Results for orders placed or performed during the hospital encounter of 05/26/21 (from the past 24 hour(s))   CBC with platelets differential   Result Value Ref Range    WBC 5.1 4.0 - 11.0 10e9/L    RBC Count 4.88 4.4 - 5.9 10e12/L    Hemoglobin 15.1 13.3 - 17.7 g/dL    Hematocrit 43.9 40.0 - 53.0 %    MCV 90 78 - 100 fl    MCH 30.9 26.5 - 33.0 pg    MCHC 34.4 31.5 - 36.5 g/dL    RDW 12.6 10.0 - 15.0 %    Platelet Count 244 150 - 450 10e9/L    Diff Method Automated Method     % Neutrophils 50.0 %    % Lymphocytes 36.0 %    % Monocytes 10.0 %    % Eosinophils 3.0 %    % Basophils 1.0 %    Absolute Neutrophil 2.5 1.6 - 8.3 10e9/L    Absolute Lymphocytes 1.8 0.8 - 5.3 10e9/L    Absolute Monocytes 0.5 0.0 - 1.3 10e9/L    Absolute Eosinophils 0.2 0.0 - 0.7 10e9/L    Absolute Basophils 0.1 0.0 - 0.2 10e9/L   CRP inflammation   Result Value Ref Range    CRP Inflammation 1.0 <10.0 mg/L   Basic metabolic panel "   Result Value Ref Range    Sodium 144 134 - 144 mmol/L    Potassium 3.9 3.5 - 5.1 mmol/L    Chloride 108 (H) 98 - 107 mmol/L    Carbon Dioxide 28 21 - 31 mmol/L    Anion Gap 8 3 - 14 mmol/L    Glucose 113 (H) 70 - 105 mg/dL    Urea Nitrogen 14 7 - 25 mg/dL    Creatinine 0.98 0.70 - 1.30 mg/dL    GFR Estimate >90 >60 mL/min/[1.73_m2]    GFR Estimate If Black >90 >60 mL/min/[1.73_m2]    Calcium 9.1 8.6 - 10.3 mg/dL   XR Thoracic Lumbar Spine 2 Views    Narrative    PROCEDURE: XR THORACIC LUMBAR SPINE 2 VW 5/26/2021 8:02 AM    HISTORY: midline pain L1, h/o osteomyelitis in rt knee    COMPARISONS: None.    TECHNIQUE: 2 views.    FINDINGS: No compression fracture or malalignment is seen. Disc spaces  are normal in height.    No bone destruction is seen to suggest osteomyelitis or discitis.         AUTUMN THOMPSON MD   XR Knee Right 3 Views    Narrative    PROCEDURE: XR KNEE RIGHT 3 VIEWS 5/26/2021 8:03 AM    HISTORY: pain ho osteo    COMPARISONS: None.    TECHNIQUE: 3 views.    FINDINGS: No fracture or dislocation is seen. There is no joint  effusion. There is no bone destruction.    There is cortical thickening along the lateral aspect of the distal  shaft of the femur extending to the condyle. Given the history this  may represent the previous area of osteomyelitis. There is no active  appearing bone destruction and no soft tissue gas is seen.         Impression    IMPRESSION: Thickening of the cortex along the lateral aspect of the  distal femur, probably the area of previous osteomyelitis. No bone  destruction to suggest acute osteomyelitis.    AUTUMN THOMPSON MD       Medications - No data to display    Assessments & Plan (with Medical Decision Making)     I have reviewed the nursing notes.    I have reviewed the findings, diagnosis, plan and need for follow up with the patient.  New Prescriptions    KETOROLAC (TORADOL) 10 MG TABLET    Take 1 tablet (10 mg) by mouth every 6 hours as needed for moderate pain      Given his history of osteomyelitis I did perform labs and plain films.  Unlike previous episode of osteomyelitis his CRP is totally normal today.  X-rays are reassuring.  Although we cannot definitively rule out osteomyelitis my clinical suspicion for an aggressive process is low given his reassuring labs, imaging and benign clinical exam.  Will place a referral to orthopedics for follow-up as he has seen previously Ortho for this 4 years ago.  It is my clinical suspicion he is just having some flareup of chronic pain from the previous process in the knee.    Oral Toradol for pain at home, relative rest and Ortho follow-up, return to ED with fever, rapidly worsening pain or redness and swelling of the joint.  Patient verbalized understanding plan is agreement he left the ED in improved condition.  Final diagnoses:   Right knee pain, unspecified chronicity   Midline low back pain without sciatica, unspecified chronicity       5/26/2021   [facility] AND \[facility]\""nikunjCharles MD  05/26/21 [id number]

## 2021-05-28 ENCOUNTER — OFFICE VISIT (OUTPATIENT)
Dept: ORTHOPEDICS | Facility: CLINIC | Age: 26
End: 2021-05-28
Payer: COMMERCIAL

## 2021-05-28 VITALS — WEIGHT: 150 LBS | BODY MASS INDEX: 22.15 KG/M2

## 2021-05-28 DIAGNOSIS — G89.29 CHRONIC PAIN OF RIGHT KNEE: Primary | ICD-10-CM

## 2021-05-28 DIAGNOSIS — M25.561 CHRONIC PAIN OF RIGHT KNEE: Primary | ICD-10-CM

## 2021-05-28 PROCEDURE — 99203 OFFICE O/P NEW LOW 30 MIN: CPT | Performed by: ORTHOPAEDIC SURGERY
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
## 2021-05-28 NOTE — NURSING NOTE
Reason For Visit:   Chief Complaint   Patient presents with     RECHECK     right knee pain        Wt 68 kg (150 lb)   BMI 22.15 kg/m      Pain Assessment  Patient Currently in Pain: Yes  0-10 Pain Scale: 8(drive aggrevated knee pain , typically 5/10)    Stephani Horn, ATC

## 2021-06-01 ENCOUNTER — HOSPITAL ENCOUNTER (EMERGENCY)
Facility: OTHER | Age: 26
Discharge: HOME | End: 2021-06-02
Attending: EMERGENCY MEDICINE | Admitting: EMERGENCY MEDICINE
Payer: COMMERCIAL

## 2021-06-01 VITALS
SYSTOLIC BLOOD PRESSURE: 125 MMHG | DIASTOLIC BLOOD PRESSURE: 70 MMHG | TEMPERATURE: 98.6 F | HEIGHT: 69 IN | HEART RATE: 76 BPM | BODY MASS INDEX: 22.22 KG/M2 | OXYGEN SATURATION: 99 % | RESPIRATION RATE: 16 BRPM | WEIGHT: 150 LBS

## 2021-06-01 DIAGNOSIS — M54.6 MIDLINE THORACIC BACK PAIN, UNSPECIFIED CHRONICITY: ICD-10-CM

## 2021-06-01 PROCEDURE — 99283 EMERGENCY DEPT VISIT LOW MDM: CPT | Performed by: EMERGENCY MEDICINE

## 2021-06-01 PROCEDURE — 250N000011 HC RX IP 250 OP 636: Performed by: EMERGENCY MEDICINE

## 2021-06-01 PROCEDURE — 96372 THER/PROPH/DIAG INJ SC/IM: CPT | Performed by: EMERGENCY MEDICINE

## 2021-06-01 PROCEDURE — 250N000013 HC RX MED GY IP 250 OP 250 PS 637: Performed by: EMERGENCY MEDICINE

## 2021-06-01 PROCEDURE — 99284 EMERGENCY DEPT VISIT MOD MDM: CPT | Performed by: EMERGENCY MEDICINE

## 2021-06-01 RX ORDER — IBUPROFEN 600 MG/1
600 TABLET, FILM COATED ORAL EVERY 6 HOURS PRN
Qty: 30 TABLET | Refills: 0 | Status: SHIPPED | OUTPATIENT
Start: 2021-06-01 | End: 2021-08-18 | Stop reason: ALTCHOICE

## 2021-06-01 RX ORDER — CYCLOBENZAPRINE HCL 10 MG
10 TABLET ORAL ONCE
Status: COMPLETED | OUTPATIENT
Start: 2021-06-01 | End: 2021-06-01

## 2021-06-01 RX ORDER — CYCLOBENZAPRINE HCL 10 MG
10 TABLET ORAL 3 TIMES DAILY PRN
Qty: 30 TABLET | Refills: 0 | Status: SHIPPED | OUTPATIENT
Start: 2021-06-01 | End: 2023-02-10

## 2021-06-01 RX ORDER — KETOROLAC TROMETHAMINE 30 MG/ML
30 INJECTION, SOLUTION INTRAMUSCULAR; INTRAVENOUS ONCE
Status: COMPLETED | OUTPATIENT
Start: 2021-06-01 | End: 2021-06-01

## 2021-06-01 RX ADMIN — KETOROLAC TROMETHAMINE 30 MG: 30 INJECTION, SOLUTION INTRAMUSCULAR at 23:56

## 2021-06-01 RX ADMIN — CYCLOBENZAPRINE 10 MG: 10 TABLET, FILM COATED ORAL at 23:56

## 2021-06-01 ASSESSMENT — MIFFLIN-ST. JEOR: SCORE: 1645.78

## 2021-06-01 NOTE — Clinical Note
Ariel Triplett was seen and treated in our emergency department on 6/1/2021.  He may return to work on 06/03/2021.       If you have any questions or concerns, please don't hesitate to call.      Yamini Queen MD

## 2021-06-02 ENCOUNTER — TELEPHONE (OUTPATIENT)
Dept: ORTHOPEDICS | Facility: CLINIC | Age: 26
End: 2021-06-02

## 2021-06-02 NOTE — ED PROVIDER NOTES
[facility]  Emergency Department Visit Note    Back Pain      History of Present Illness     HPI:  Ariel Triplett is a 25 year old male presenting with back pain. The patient does have a history of chronic back pain with similar symptoms. The current symptoms started 2 weeks ago. He was seen in the ED on 5/26 for this and knee pain and subsequently saw ortho for his knee pain. XR of thoracic spine and labs reassuring on ED visit. 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 Symptoms were precipitated by movement and lifting. Pain is worse with walking and changes in position. There is not associated numbness, tingling, weakness, bowel or bladder incontinence, dysuria, hematuria, constipation, diarrhea, fever.    Medications:  Prior to Admission medications    Medication Sig Last Dose Taking? Auth Provider   cyclobenzaprine (FLEXERIL) 10 MG tablet Take 1 tablet (10 mg) by mouth 3 times daily as needed for muscle spasms  Yes Yamini Queen MD   ibuprofen (ADVIL/MOTRIN) 600 MG tablet Take 1 tablet (600 mg) by mouth every 6 hours as needed for moderate pain  Yes Yamini Queen MD   acetaminophen (TYLENOL) 325 MG tablet Take 2 tablets (650 mg) by mouth every 4 hours as needed for mild pain   Cesar Leon MD   gabapentin (NEURONTIN) 100 MG capsule Take 1 capsule (100 mg) by mouth 3 times daily   Neymar Landers MD   HYDROcodone-acetaminophen (NORCO) 5-325 MG tablet Take 1 tablet by mouth every 6 hours as needed for moderate to severe pain   Malcom Bruner PA-C   HYDROmorphone (DILAUDID) 2 MG tablet Take 1-2 tablets (2-4 mg) by mouth every 4 hours as needed for moderate to severe pain   Neymar Landers MD   hydrOXYzine (ATARAX) 25 MG tablet Take 1 tablet (25 mg) by mouth every 6 hours as needed for other (adjuvant pain)   Neymar Landers MD   ketorolac (TORADOL) 10 MG tablet Take 1 tablet (10 mg) by mouth every 6 hours as needed for moderate pain  "  Charles Forrest MD   multivitamin w/minerals (MULTI-VITAMIN) tablet Take 1 tablet by mouth daily   Reported, Patient   oxyCODONE (ROXICODONE) 5 MG IR tablet Take 1-2 tablets (5-10 mg) by mouth every 4 hours as needed for moderate to severe pain   Yina Fernandez APRN CNP   senna-docusate (SENOKOT-S;PERICOLACE) 8.6-50 MG per tablet Take 1-2 tablets by mouth 2 times daily   Cesar Loen MD       Allergies:  Allergies   Allergen Reactions     Seasonal Allergies Other (See Comments)     Stuffy nose       Problem List:  Patient Active Problem List   Diagnosis     Osteosarcoma (H)       Past Medical History:  Past Medical History:   Diagnosis Date     Otitis media     3/4/05,L       Past Surgical History:  Past Surgical History:   Procedure Laterality Date     BIOPSY BONE LOWER EXTREMITY Right 8/28/2017    Procedure: BIOPSY BONE LOWER EXTREMITY;  Open Biopsy Right Distal Femur ;  Surgeon: Neymar Landers MD;  Location: [facility] OR     OTHER SURGICAL HISTORY      23227.0,PAST SURGICAL HISTORY,Unremarkable       Social History:  Social History     Tobacco Use     Smoking status: Current Some Day Smoker     Smokeless tobacco: Never Used   Substance Use Topics     Alcohol use: Yes     Alcohol/week: 0.0 standard drinks     Comment: Alcoholic Drinks/day: once/week     Drug use: Unknown     Types: Other     Comment: Drug use: No       Review of Systems:  10 point review of systems obtained and pertinent positive and negative findings noted in HPI. Review of systems otherwise negative.      Physical Exam     Vital signs: /70   Pulse 76   Temp 98.6  F (37  C) (Temporal)   Resp 16   Ht 1.753 m (5' 9\")   Wt 68 kg (150 lb)   SpO2 99%   BMI 22.15 kg/m      Physical Exam:    General: awake and alert, in no  distress  HEENT: no scleral injection, no nasal discharge, neck supple  Chest: non labored respirations, symmetric chest rise, no accessory muscle use  Cardiovascular: regular rate, regular rhythm, " distally capillary refill intact  Abdomen: soft, nondistended, nontender  Back: tender in thoracic (t9-t10) paraspinal muscles with minimal midline tenderness  Skin: warm, dry, no rashes  Extremities: no deformities or edema  Neuro: alert and oriented x 3, moving extremities x 4, bilateral plantar/dorsiflexion and knee extension 5/5, bilateral straight leg raise does not elicits back pain sensation intact in bilateral lower legs to light touch    Medical Decision Making & ED Course     Ariel Triplett is a 25 year old male presenting with back pain. Differential includes muscular strain or spasm, degenerative joint and disc disease, acute herniated disc, sciatica, fracture, epidural abscess, discitis, vertebral osteomyelitis, nephrolithiasis, pyelonephritis, malignancy. The character and location of the patient's pain with a benign neurologic exam suggest a low likelihood of malignancy, infectious process, or fracture, and this is most likely to be a soft tissue musculoskeletal process. It is difficult to differentiate between muscular strain or spasm, degenerative disease, and acute herniated disc. However, emergency department treatment of these symptoms with a benign neurologic exam is the same and does not require emergent diagnostic imaging. The patient was treated with toradol and flexeril forpain control in the emergency department with improvement in symptoms and is stable for outpatient evaluation and management. Will prescribe ibuprofen and flexeril as needed for pain control and recommend close follow up with a primary care provider. MRI t spine ordered as previous XR negative and he has a history of osteomyelitics.  Patient given instructions on follow-up and warning signs for which to return to ED. All questions were answered and the patient is comfortable with plan for discharge. The patient was discharged in stable condition.    I have reviewed the patients previos laboratory studies and imaging and  medical records.  .    Diagnosis & Disposition     Diagnosis:  1. Midline thoracic back pain, unspecified chronicity        Disposition:  Home    MD Bernice Mack Theresa M, MD  06/02/21 0012

## 2021-06-02 NOTE — ED TRIAGE NOTES
"Pt here with friend with c/o low back pain that has been ongoing but worse today with spasms, VSS, pt last took tylenol and ibuprofen at 2 PM, pt reports 2 falls today with the pain, pt out into waiting room to wait for ED room  ED Nursing Triage Note (General)   ________________________________    Ariel GASPER Triplett is a 25 year old Male that presents to triage private car  With history of  Back pain reported by patient   Significant symptoms had onset ongoing  /70   Pulse 76   Temp 98.6  F (37  C) (Temporal)   Resp 16   Ht 1.753 m (5' 9\")   Wt 68 kg (150 lb)   SpO2 99%   BMI 22.15 kg/m  t  Patient appears alert  and oriented, in no acute distress., and cooperative and pleasant behavior.    GCS Total = 15  Airway: intact  Breathing noted as Normal  Circulation Normal  Skin:  Normal  Action taken: out to waiting room    PRE HOSPITAL PRIOR LIVING SITUATION Spouse    "

## 2021-06-02 NOTE — TELEPHONE ENCOUNTER
"[facility] Call Center    Phone Message    May a detailed message be left on voicemail: yes     Reason for Call: Other: Patient stated that he is going to be having a Thoracic spine MRI, it notes on order to \"coordinate with the knee MRI\" though it was not clear if the MRI for the knee was added. Patient was just calling as an FYI, though he will be making sure the MRI of knee will be done at that MRI appt also.      Action Taken: Message routed to:  [facility]o    Travel Screening: Not Applicable                                                                      "

## 2021-06-03 ENCOUNTER — HOSPITAL ENCOUNTER (OUTPATIENT)
Dept: MRI IMAGING | Facility: OTHER | Age: 26
End: 2021-06-03
Attending: EMERGENCY MEDICINE
Payer: COMMERCIAL

## 2021-06-03 ENCOUNTER — HOSPITAL ENCOUNTER (OUTPATIENT)
Dept: MRI IMAGING | Facility: OTHER | Age: 26
End: 2021-06-03
Attending: ORTHOPAEDIC SURGERY
Payer: COMMERCIAL

## 2021-06-03 ENCOUNTER — TRANSFERRED RECORDS (OUTPATIENT)
Dept: HEALTH INFORMATION MANAGEMENT | Facility: CLINIC | Age: 26
End: 2021-06-03

## 2021-06-03 DIAGNOSIS — M54.6 MIDLINE THORACIC BACK PAIN, UNSPECIFIED CHRONICITY: ICD-10-CM

## 2021-06-03 DIAGNOSIS — M25.561 RIGHT KNEE PAIN: ICD-10-CM

## 2021-06-03 PROCEDURE — 73721 MRI JNT OF LWR EXTRE W/O DYE: CPT | Mod: RT

## 2021-06-03 PROCEDURE — 72146 MRI CHEST SPINE W/O DYE: CPT

## 2021-06-04 ENCOUNTER — TELEPHONE (OUTPATIENT)
Dept: ORTHOPEDICS | Facility: CLINIC | Age: 26
End: 2021-06-04

## 2021-06-04 DIAGNOSIS — M54.89 OTHER ACUTE BACK PAIN: Primary | ICD-10-CM

## 2021-06-04 NOTE — TELEPHONE ENCOUNTER
I spoke with the patient to let him know that his knee MRI shows no sign of recurrent osteomyelitis.  He does have a meniscus tear which was present in 2017.  It sounds like he is not really having symptoms of a medial meniscus tear.  He was seen recently in his local emergency department for severe mid to low back pain.  They ordered a thoracic MRI which was normal.  He continues to have low back pain and also neck pain.  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  He is having some tingling in both upper extremities.  He denies any fever or chills.  He is otherwise feeling healthy.  No recent illnesses.  I would like to order an MRI of the cervical spine and lumbar spine to look for any other pathology causing his severe pain.  I will write him off work until we obtain those results.  The MRI will be done at [facility] and they will send the images to us.  I will email him a work note to remain off work at this time.  We will call him with the results.  All questions answered.

## 2021-06-04 NOTE — LETTER
Return to Work  2021     Seen today: no    Patient:  Ariel Triplett  :   1995  MRN:     [id number]  Physician: DARLENE LANE    Ariel Triplett may not return to work until we receive the results of his cervical spine MRI and lumbar spine MRI.    Patient limitations:  No lifting, pushing or pulling.        Sincerely,      Electronically signed by Darlene Lane PA-C

## 2021-06-09 ENCOUNTER — TRANSFERRED RECORDS (OUTPATIENT)
Dept: HEALTH INFORMATION MANAGEMENT | Facility: CLINIC | Age: 26
End: 2021-06-09

## 2021-06-10 ENCOUNTER — HOSPITAL ENCOUNTER (OUTPATIENT)
Dept: MRI IMAGING | Facility: OTHER | Age: 26
Discharge: HOME | End: 2021-06-10
Attending: PHYSICIAN ASSISTANT | Admitting: FAMILY MEDICINE
Payer: COMMERCIAL

## 2021-06-10 DIAGNOSIS — M54.89 OTHER ACUTE BACK PAIN: ICD-10-CM

## 2021-06-10 PROCEDURE — 72148 MRI LUMBAR SPINE W/O DYE: CPT

## 2021-06-17 ENCOUNTER — TELEPHONE (OUTPATIENT)
Dept: ORTHOPEDICS | Facility: CLINIC | Age: 26
End: 2021-06-17

## 2021-06-17 DIAGNOSIS — M54.89 OTHER ACUTE BACK PAIN: Primary | ICD-10-CM

## 2021-06-17 NOTE — TELEPHONE ENCOUNTER
[facility] Call Center    Phone Message    May a detailed message be left on voicemail: no     Reason for Call: Other: Patient left  06-16 @ 7:17pm, wondering if he can get his MRI results     Action Taken: Message routed to:  [facility]    Travel Screening: Not Applicable

## 2021-06-17 NOTE — TELEPHONE ENCOUNTER
I called the patient with his lumbar MRI results.  He also had cervical spine MRI, but I do not see those.  Those were done in [place] he says, so I will have to request them.  I can see some of the cervical spine on the  images and there is no obvious abnormality or cord compression.  I will call him once I see the more detailed cervical spine MRI.  The lumbar MRI shows no concerning findings other than mild early facet arthritis.  He still reports he has back pain every day.  He has never done physical therapy.  I would like to send him to physical therapy for a few sessions to work on strengthening, core stabilization and home exercise program.  If he is Restorationism about doing this, he should start to see results in 4 to 6 weeks.  He is also still having persistent right knee pain, including popping and giving way.  He does have a meniscal tear seen on the MRI, which has been present for a few years.  I put in a referral to have him see one of our knee specialists to consider arthroscopy based on his symptoms.  They will evaluate him and discuss options.  He agrees with that plan and all questions have been answered.

## 2021-06-18 ENCOUNTER — TELEPHONE (OUTPATIENT)
Dept: ORTHOPEDICS | Facility: CLINIC | Age: 26
End: 2021-06-18

## 2021-06-18 NOTE — TELEPHONE ENCOUNTER
Called pt to let him know that c-spine MRI showed just a little early arthritis at C6-7, but no significant findings that would be a cause for his back pain.  Start PT and update us in a month.

## 2021-06-18 NOTE — TELEPHONE ENCOUNTER
----- Message from Viv Denton RN sent at 6/18/2021  2:36 PM CDT -----  Cervical imaging in for review,  report in your mailbox.  When reviewed please put report in scanning basket.  thanks  Viv

## 2021-06-22 ENCOUNTER — TELEPHONE (OUTPATIENT)
Dept: ORTHOPEDICS | Facility: CLINIC | Age: 26
End: 2021-06-22

## 2021-06-22 NOTE — TELEPHONE ENCOUNTER
[facility] Call Center    Phone Message    May a detailed message be left on voicemail: yes     Reason for Call: Other: Patient needs a workability slip stating if he is able to work and what restrictions he has, then also he is stating the falls have happened at work, though WC is denying the injuries were work comp related. So he would like a statement/notes about the occurance of visits to you and how they are work related.      Action Taken: Message routed to:  [facility] Ortho    Travel Screening: Not Applicable

## 2021-06-23 NOTE — TELEPHONE ENCOUNTER
New work note was written. Emailed pt and mailed letter to address on file.     Pt also stated that his Work Comp got declined. Pt stated that he has collapsed at work multiple times. 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 WC needs a statement stating that his job either caused his injury or made it worse. Pt states he needs a note that Work caused his injury. Writer informed pt that she's unable to make this call and that she will forward it to the care team to advice.     Forwarded to care team to advise.     Stacy Evans ATC

## 2021-06-23 NOTE — TELEPHONE ENCOUNTER
Attempted to reach patient. Pt's voicemail box hasn't been set up yet. Pt needs to be seen in person and can't be assessed virtually.     Stacy Evans ATC

## 2021-08-18 ENCOUNTER — HOSPITAL ENCOUNTER (EMERGENCY)
Facility: OTHER | Age: 26
Discharge: HOME | End: 2021-08-18
Attending: PHYSICIAN ASSISTANT | Admitting: PHYSICIAN ASSISTANT
Payer: COMMERCIAL

## 2021-08-18 VITALS
BODY MASS INDEX: 22.15 KG/M2 | OXYGEN SATURATION: 98 % | WEIGHT: 150 LBS | TEMPERATURE: 98.1 F | DIASTOLIC BLOOD PRESSURE: 68 MMHG | HEART RATE: 84 BPM | RESPIRATION RATE: 18 BRPM | SYSTOLIC BLOOD PRESSURE: 116 MMHG

## 2021-08-18 DIAGNOSIS — S02.5XXA CLOSED FRACTURE OF TOOTH, INITIAL ENCOUNTER: ICD-10-CM

## 2021-08-18 DIAGNOSIS — K08.89 PAIN, DENTAL: ICD-10-CM

## 2021-08-18 PROCEDURE — 99284 EMERGENCY DEPT VISIT MOD MDM: CPT | Performed by: PHYSICIAN ASSISTANT

## 2021-08-18 PROCEDURE — 99283 EMERGENCY DEPT VISIT LOW MDM: CPT | Performed by: PHYSICIAN ASSISTANT

## 2021-08-18 PROCEDURE — 96372 THER/PROPH/DIAG INJ SC/IM: CPT | Performed by: PHYSICIAN ASSISTANT

## 2021-08-18 PROCEDURE — 250N000011 HC RX IP 250 OP 636: Performed by: PHYSICIAN ASSISTANT

## 2021-08-18 RX ORDER — HYDROCODONE BITARTRATE AND ACETAMINOPHEN 5; 325 MG/1; MG/1
1 TABLET ORAL EVERY 6 HOURS PRN
Qty: 10 TABLET | Refills: 0 | Status: SHIPPED | OUTPATIENT
Start: 2021-08-18 | End: 2023-02-10

## 2021-08-18 RX ORDER — KETOROLAC TROMETHAMINE 30 MG/ML
60 INJECTION, SOLUTION INTRAMUSCULAR; INTRAVENOUS ONCE
Status: COMPLETED | OUTPATIENT
Start: 2021-08-18 | End: 2021-08-18

## 2021-08-18 RX ORDER — CEFTRIAXONE SODIUM 1 G
1 VIAL (EA) INJECTION ONCE
Status: COMPLETED | OUTPATIENT
Start: 2021-08-18 | End: 2021-08-18

## 2021-08-18 RX ORDER — IBUPROFEN 800 MG/1
800 TABLET, FILM COATED ORAL EVERY 8 HOURS PRN
Qty: 60 TABLET | Refills: 0 | Status: SHIPPED | OUTPATIENT
Start: 2021-08-18 | End: 2023-02-10

## 2021-08-18 RX ADMIN — CEFTRIAXONE SODIUM 1 G: 1 INJECTION, POWDER, FOR SOLUTION INTRAMUSCULAR; INTRAVENOUS at 18:30

## 2021-08-18 RX ADMIN — KETOROLAC TROMETHAMINE 60 MG: 30 INJECTION, SOLUTION INTRAMUSCULAR at 18:31

## 2021-08-18 ASSESSMENT — ENCOUNTER SYMPTOMS
STRIDOR: 0
CONFUSION: 0
FEVER: 0
TREMORS: 0
FLANK PAIN: 0
SEIZURES: 0
ABDOMINAL PAIN: 0
EYE PAIN: 0
AGITATION: 0
BACK PAIN: 0
FACIAL SWELLING: 0
FACIAL ASYMMETRY: 0

## 2021-08-18 NOTE — ED PROVIDER NOTES
History     Chief Complaint   Patient presents with     Dental Pain     HPI  Ariel Triplett is a 26 year old male who reports he fractured his right upper posterior wisdom tooth about 6 months ago.  He has been to the ER previously and was placed on antibiotics which seemed to clear this up.  He however has noticed increasing pain over the past few days and he has been trying to get into see a dentist.  Today he was informed that most are not taking new patients.  He has tried to get into the [facility] center as well and was informed to call at 745 each morning until he have an opening.  Denies any fever or chills.  No nausea or vomiting.  No lightheadedness or dizziness.  No cough or SOB.    Allergies:  Allergies   Allergen Reactions     Seasonal Allergies Other (See Comments)     Stuffy nose       Problem List:    Patient Active Problem List    Diagnosis Date Noted     Osteosarcoma (H) 08/23/2017     Priority: Medium        Past Medical History:    Past Medical History:   Diagnosis Date     Otitis media        Past Surgical History:    Past Surgical History:   Procedure Laterality Date     BIOPSY BONE LOWER EXTREMITY Right 8/28/2017    Procedure: BIOPSY BONE LOWER EXTREMITY;  Open Biopsy Right Distal Femur ;  Surgeon: Neymar Landers MD;  Location: [facility] OR     OTHER SURGICAL HISTORY      08263.0,PAST SURGICAL HISTORY,Unremarkable       Family History:    No family history on file.    Social History:  Marital Status:  Single [1]  Social History     Tobacco Use     Smoking status: Current Some Day Smoker     Smokeless tobacco: Never Used   Substance Use Topics     Alcohol use: Yes     Alcohol/week: 0.0 standard drinks     Comment: Alcoholic Drinks/day: once/week     Drug use: Unknown     Types: Other     Comment: Drug use: No        Medications:    amoxicillin-clavulanate (AUGMENTIN) 875-125 MG tablet  HYDROcodone-acetaminophen (NORCO) 5-325 MG tablet  ibuprofen (ADVIL/MOTRIN) 800 MG tablet  acetaminophen  (TYLENOL) 325 MG tablet  cyclobenzaprine (FLEXERIL) 10 MG tablet  gabapentin (NEURONTIN) 100 MG capsule  HYDROmorphone (DILAUDID) 2 MG tablet  hydrOXYzine (ATARAX) 25 MG tablet  ketorolac (TORADOL) 10 MG tablet  multivitamin w/minerals (MULTI-VITAMIN) tablet  oxyCODONE (ROXICODONE) 5 MG IR tablet  senna-docusate (SENOKOT-S;PERICOLACE) 8.6-50 MG per tablet          Review of Systems   Constitutional: Negative for fever.   HENT: Positive for dental problem. Negative for drooling and facial swelling.    Eyes: Negative for pain and visual disturbance.   Respiratory: Negative for stridor.    Cardiovascular: Negative for chest pain.   Gastrointestinal: Negative for abdominal pain.   Genitourinary: Negative for flank pain.   Musculoskeletal: Negative for back pain.   Skin: Negative for pallor.   Neurological: Negative for tremors, seizures and facial asymmetry.   Psychiatric/Behavioral: Negative for agitation and confusion.   All other systems reviewed and are negative.      Physical Exam   BP: 116/68  Pulse: 84  Temp: 98.1  F (36.7  C)  Resp: 18  Weight: 68 kg (150 lb)  SpO2: 98 %      Physical Exam  Vitals and nursing note reviewed.   Constitutional:       General: He is not in acute distress.     Appearance: Normal appearance. He is not ill-appearing or toxic-appearing.   HENT:      Head: Normocephalic. No raccoon eyes, right periorbital erythema or left periorbital erythema.      Right Ear: No drainage or tenderness.      Left Ear: No drainage or tenderness.      Nose: Nose normal.      Mouth/Throat:        Comments: Right posterior upper wisdom tooth with a crack in this.  I am unable to appreciate any significant drainage.  Some localized gingival erythema and tenderness.  This is tooth #1.  Eyes:      General: Lids are normal. Gaze aligned appropriately. No scleral icterus.     Extraocular Movements: Extraocular movements intact.   Neck:      Trachea: No tracheal deviation.   Cardiovascular:      Rate and Rhythm:  Normal rate.   Pulmonary:      Effort: Pulmonary effort is normal. No respiratory distress.      Breath sounds: No stridor. No wheezing.   Abdominal:      Tenderness: There is no abdominal tenderness.   Musculoskeletal:         General: No deformity or signs of injury. Normal range of motion.      Cervical back: Normal range of motion. No signs of trauma.   Skin:     General: Skin is warm and dry.      Coloration: Skin is not jaundiced or pale.   Neurological:      General: No focal deficit present.      Mental Status: He is alert and oriented to person, place, and time.      GCS: GCS eye subscore is 4. GCS verbal subscore is 5. GCS motor subscore is 6.      Motor: No tremor or seizure activity.   Psychiatric:         Attention and Perception: Attention normal.         Mood and Affect: Mood normal.         ED Course     No results found for this or any previous visit (from the past 24 hour(s)).    Medications   cefTRIAXone (ROCEPHIN) in lidocaine 1% (PF) injection 1 g (1 g Intramuscular Given 8/18/21 1830)   ketorolac (TORADOL) injection 60 mg (60 mg Intramuscular Given 8/18/21 1831)       Assessments & Plan (with Medical Decision Making)     I have reviewed the nursing notes.    I have reviewed the findings, diagnosis, plan and need for follow up with the patient.      New Prescriptions    AMOXICILLIN-CLAVULANATE (AUGMENTIN) 875-125 MG TABLET    Take 1 tablet by mouth 2 times daily    HYDROCODONE-ACETAMINOPHEN (NORCO) 5-325 MG TABLET    Take 1 tablet by mouth every 6 hours as needed for moderate to severe pain    IBUPROFEN (ADVIL/MOTRIN) 800 MG TABLET    Take 1 tablet (800 mg) by mouth every 8 hours as needed for moderate pain       Final diagnoses:   Closed fracture of tooth, initial encounter - tooth #1 ( right posterior wisdom tooth)   Pain, dental     Afebrile.  Vital signs stable.  A fractured right upper posterior wisdom tooth.  Some localized gingival swelling.  Concerns for dental abscess.  He was given  Rocephin as well as Toradol IM in the ER.  [facility] was checked and no signs of significant abuse of narcotic medications.  Rx for short course of Norco, and Motrin for pain relief.  Rx as well for 10-day course of Augmentin.  Referral to Dr. Chiang DDS for definitive dental evaluation and treatment.  8/18/2021   [facility] AND [facility]     PeterAlbuquerque Indian Dental ClinicMalcom pineda PA-C  08/18/21 8465

## 2021-08-18 NOTE — ED TRIAGE NOTES
"ED Nursing Triage Note (General)   ________________________________    Ariel Triplett is a 26 year old Male that presents to triage via private vehicle with complaints of dental pain.  Patient states he has a \"shattered wisdom tooth\" and states it initially broke approx 6 months ago and states it is now cracked/chipped.  Patient is concerned that he will develop an abscess or septic infection if he does not get his tooth fixed. Patient states he has attempted to call dental offices, however, states no office is excepting new patients.   Significant symptoms had onset 6 months ago.  Patient appears alert behavior.  GCS-15  Airway: intact  Breathing noted as Normal  Circulation-normal  Action taken: 4      PRE HOSPITAL PRIOR LIVING SITUATION-home  "

## 2021-08-27 ENCOUNTER — APPOINTMENT (OUTPATIENT)
Dept: CT IMAGING | Facility: OTHER | Age: 26
End: 2021-08-27
Attending: EMERGENCY MEDICINE
Payer: COMMERCIAL

## 2021-08-27 ENCOUNTER — HOSPITAL ENCOUNTER (EMERGENCY)
Facility: OTHER | Age: 26
Discharge: HOME | End: 2021-08-27
Attending: EMERGENCY MEDICINE | Admitting: EMERGENCY MEDICINE
Payer: COMMERCIAL

## 2021-08-27 VITALS
BODY MASS INDEX: 23.11 KG/M2 | HEIGHT: 69 IN | RESPIRATION RATE: 14 BRPM | OXYGEN SATURATION: 95 % | SYSTOLIC BLOOD PRESSURE: 107 MMHG | TEMPERATURE: 99.4 F | WEIGHT: 156 LBS | HEART RATE: 65 BPM | DIASTOLIC BLOOD PRESSURE: 64 MMHG

## 2021-08-27 DIAGNOSIS — M54.2 NECK PAIN: ICD-10-CM

## 2021-08-27 LAB
ANION GAP SERPL CALCULATED.3IONS-SCNC: 7 MMOL/L (ref 3–14)
BASOPHILS # BLD AUTO: 0 10E3/UL (ref 0–0.2)
BASOPHILS NFR BLD AUTO: 0 %
BUN SERPL-MCNC: 15 MG/DL (ref 7–25)
CALCIUM SERPL-MCNC: 9.6 MG/DL (ref 8.6–10.3)
CHLORIDE BLD-SCNC: 107 MMOL/L (ref 98–107)
CO2 SERPL-SCNC: 25 MMOL/L (ref 21–31)
CREAT SERPL-MCNC: 1.12 MG/DL (ref 0.7–1.3)
EOSINOPHIL # BLD AUTO: 0.1 10E3/UL (ref 0–0.7)
EOSINOPHIL NFR BLD AUTO: 1 %
ERYTHROCYTE [DISTWIDTH] IN BLOOD BY AUTOMATED COUNT: 12.3 % (ref 10–15)
GFR SERPL CREATININE-BSD FRML MDRD: 89 ML/MIN/1.73M2
GLUCOSE BLD-MCNC: 89 MG/DL (ref 70–105)
HCT VFR BLD AUTO: 42.5 % (ref 40–53)
HGB BLD-MCNC: 14.8 G/DL (ref 13.3–17.7)
IMM GRANULOCYTES # BLD: 0 10E3/UL
IMM GRANULOCYTES NFR BLD: 0 %
LYMPHOCYTES # BLD AUTO: 1.1 10E3/UL (ref 0.8–5.3)
LYMPHOCYTES NFR BLD AUTO: 12 %
MCH RBC QN AUTO: 30.8 PG (ref 26.5–33)
MCHC RBC AUTO-ENTMCNC: 34.8 G/DL (ref 31.5–36.5)
MCV RBC AUTO: 88 FL (ref 78–100)
MONOCYTES # BLD AUTO: 0.8 10E3/UL (ref 0–1.3)
MONOCYTES NFR BLD AUTO: 10 %
NEUTROPHILS # BLD AUTO: 6.7 10E3/UL (ref 1.6–8.3)
NEUTROPHILS NFR BLD AUTO: 77 %
NRBC # BLD AUTO: 0 10E3/UL
NRBC BLD AUTO-RTO: 0 /100
PLATELET # BLD AUTO: 280 10E3/UL (ref 150–450)
POTASSIUM BLD-SCNC: 3.6 MMOL/L (ref 3.5–5.1)
RBC # BLD AUTO: 4.81 10E6/UL (ref 4.4–5.9)
SODIUM SERPL-SCNC: 139 MMOL/L (ref 134–144)
WBC # BLD AUTO: 8.8 10E3/UL (ref 4–11)

## 2021-08-27 PROCEDURE — 36415 COLL VENOUS BLD VENIPUNCTURE: CPT | Performed by: EMERGENCY MEDICINE

## 2021-08-27 PROCEDURE — 250N000011 HC RX IP 250 OP 636: Performed by: EMERGENCY MEDICINE

## 2021-08-27 PROCEDURE — 99283 EMERGENCY DEPT VISIT LOW MDM: CPT | Performed by: EMERGENCY MEDICINE

## 2021-08-27 PROCEDURE — 99285 EMERGENCY DEPT VISIT HI MDM: CPT | Mod: 25 | Performed by: EMERGENCY MEDICINE

## 2021-08-27 PROCEDURE — 70498 CT ANGIOGRAPHY NECK: CPT | Mod: TC

## 2021-08-27 PROCEDURE — 80048 BASIC METABOLIC PNL TOTAL CA: CPT | Performed by: EMERGENCY MEDICINE

## 2021-08-27 PROCEDURE — 85025 COMPLETE CBC W/AUTO DIFF WBC: CPT | Performed by: EMERGENCY MEDICINE

## 2021-08-27 RX ORDER — IOPAMIDOL 755 MG/ML
100 INJECTION, SOLUTION INTRAVASCULAR ONCE
Status: COMPLETED | OUTPATIENT
Start: 2021-08-27 | End: 2021-08-27

## 2021-08-27 RX ADMIN — IOPAMIDOL 100 ML: 755 INJECTION, SOLUTION INTRAVENOUS at 01:51

## 2021-08-27 ASSESSMENT — MIFFLIN-ST. JEOR: SCORE: 1667.99

## 2021-08-27 NOTE — ED TRIAGE NOTES
Pt states was stretching and heard a pop in neck, complains of neck pain. Pt appears anxious and is shaking, stumbling words.

## 2021-08-27 NOTE — ED PROVIDER NOTES
[facility]  Emergency Department Visit Note    Neck Pain and Anxiety      History of Present Illness     HPI:  Ariel Triplett is a 26 year old male presenting with neck pain and word finding difficulties. These symptoms started 4.5 hours ago and the onset was while stretching he felt a pop in his neck..  The neck pain quality is sharp and stabbing and initially was a 10 out of 10 but now is a 2 out of 10.  He states immediately after that he felt as if there was blood on the lower half of his face.  He states his sensation was on both sides.  He asked his friend if his face looked bloody or different and the friend said no he states that since that time he said problems finding the words and is very frustrated.  He denies any numbness or tingling in his face or either side of his body.  He denies weakness.  He has no problems with his vision.  His speech is articulate but he states that he is having a hard time remembering words to certain things.  He states he tried to text the word seizure but cannot remember how to spell it.  He has no history of this in the past.  He currently denies any headache, nausea, vomiting, chest pain, shortness of breath  No recent trauma or illness.    Medications:  Prior to Admission medications    Medication Sig Last Dose Taking? Auth Provider   acetaminophen (TYLENOL) 325 MG tablet Take 2 tablets (650 mg) by mouth every 4 hours as needed for mild pain   Cesar Leon MD   amoxicillin-clavulanate (AUGMENTIN) 875-125 MG tablet Take 1 tablet by mouth 2 times daily   Malcom Bruner PA-C   cyclobenzaprine (FLEXERIL) 10 MG tablet Take 1 tablet (10 mg) by mouth 3 times daily as needed for muscle spasms   Yamini Queen MD   gabapentin (NEURONTIN) 100 MG capsule Take 1 capsule (100 mg) by mouth 3 times daily   Neymar Landers MD   HYDROcodone-acetaminophen (NORCO) 5-325 MG tablet Take 1 tablet by mouth every 6 hours as needed for moderate  to severe pain   Malcom Bruner PA-C   HYDROmorphone (DILAUDID) 2 MG tablet Take 1-2 tablets (2-4 mg) by mouth every 4 hours as needed for moderate to severe pain   Neymar Landers MD   hydrOXYzine (ATARAX) 25 MG tablet Take 1 tablet (25 mg) by mouth every 6 hours as needed for other (adjuvant pain)   Neymar Landers MD   ibuprofen (ADVIL/MOTRIN) 800 MG tablet Take 1 tablet (800 mg) by mouth every 8 hours as needed for moderate pain   Malcom Bruner PA-C   ketorolac (TORADOL) 10 MG tablet Take 1 tablet (10 mg) by mouth every 6 hours as needed for moderate pain   Charles Forrest MD   multivitamin w/minerals (MULTI-VITAMIN) tablet Take 1 tablet by mouth daily   Reported, Patient   oxyCODONE (ROXICODONE) 5 MG IR tablet Take 1-2 tablets (5-10 mg) by mouth every 4 hours as needed for moderate to severe pain   Yina Fernandez APRN CNP   senna-docusate (SENOKOT-S;PERICOLACE) 8.6-50 MG per tablet Take 1-2 tablets by mouth 2 times daily   Cesar Leon MD       Allergies:  Allergies   Allergen Reactions     Seasonal Allergies Other (See Comments)     Stuffy nose       Problem List:  Patient Active Problem List   Diagnosis     Osteosarcoma (H)       Past Medical History:  Past Medical History:   Diagnosis Date     Otitis media     3/4/05,L       Past Surgical History:  Past Surgical History:   Procedure Laterality Date     BIOPSY BONE LOWER EXTREMITY Right 8/28/2017    Procedure: BIOPSY BONE LOWER EXTREMITY;  Open Biopsy Right Distal Femur ;  Surgeon: Neymar Landers MD;  Location: [facility] OR     OTHER SURGICAL HISTORY      97279.0,PAST SURGICAL HISTORY,Unremarkable       Social History:  Social History     Tobacco Use     Smoking status: Current Some Day Smoker     Smokeless tobacco: Never Used   Substance Use Topics     Alcohol use: Yes     Alcohol/week: 0.0 standard drinks     Comment: Alcoholic Drinks/day: once/week     Drug use: Unknown     Types: Other     Comment: Drug use: No  "      Review of Systems:  10 point review of systems obtained and pertinent positive and negative findings noted in HPI. Review of systems otherwise negative.      Physical Exam     Vital signs: /63   Pulse 68   Temp 99.4  F (37.4  C) (Tympanic)   Resp 14   Ht 1.753 m (5' 9\")   Wt 70.8 kg (156 lb)   SpO2 94%   BMI 23.04 kg/m      Physical Exam:    General: awake and alert, uncomfortable  HEENT: atraumatic, no scleral injection, no nasal discharge, neck supple  Chest: clear to auscultation bilaterally without wheezes or crackles, non labored respirations  Cardiovascular: regular rate and rhythm, no murmurs or gallops  Abdomen: soft, nontender, no rebound or guarding, nondistended  Extremities: no deformities, edema, or tenderness  Skin: warm, dry, no rashes  Neuro: alert and oriented x 3, moving extremities x 4, walks on heels and toes without difficulty, CN II-XII intact, hand  5/5 b/l  NIHSS 0    Medical Decision Making & ED Course     Ariel Triplett is a 26 year old male presenting with neck pain and word finding difficulties. Differential includes migraine, tension headache, cluster headache, vertebral artery dissection, intracranial hemorrhage, intracranial mass, somatoform disorder, anxiety. CTA done and negative. Patient given instructions on follow-up and warning signs for which to return to ED. All questions were answered and the patient is comfortable with plan for discharge. The patient was discharged in stable condition.    I have reviewed the patients laboratory studies, imaging, and medical records.  .    Diagnosis & Disposition     Diagnosis:  1. Neck pain        Disposition:  home    MD Bernice Mack Theresa M, MD  08/27/21 0302    "

## 2021-11-30 ENCOUNTER — ALLIED HEALTH/NURSE VISIT (OUTPATIENT)
Dept: FAMILY MEDICINE | Facility: OTHER | Age: 26
End: 2021-11-30
Attending: FAMILY MEDICINE
Payer: COMMERCIAL

## 2021-11-30 DIAGNOSIS — Z20.822 COVID-19 RULED OUT: Primary | ICD-10-CM

## 2021-11-30 PROCEDURE — C9803 HOPD COVID-19 SPEC COLLECT: HCPCS

## 2021-11-30 PROCEDURE — U0003 INFECTIOUS AGENT DETECTION BY NUCLEIC ACID (DNA OR RNA); SEVERE ACUTE RESPIRATORY SYNDROME CORONAVIRUS 2 (SARS-COV-2) (CORONAVIRUS DISEASE [COVID-19]), AMPLIFIED PROBE TECHNIQUE, MAKING USE OF HIGH THROUGHPUT TECHNOLOGIES AS DESCRIBED BY CMS-2020-01-R: HCPCS | Mod: ZL

## 2021-12-02 LAB — SARS-COV-2 RNA RESP QL NAA+PROBE: POSITIVE

## 2021-12-04 ENCOUNTER — HEALTH MAINTENANCE LETTER (OUTPATIENT)
Age: 26
End: 2021-12-04

## 2021-12-07 ENCOUNTER — ALLIED HEALTH/NURSE VISIT (OUTPATIENT)
Dept: FAMILY MEDICINE | Facility: OTHER | Age: 26
End: 2021-12-07
Attending: FAMILY MEDICINE
Payer: COMMERCIAL

## 2021-12-07 DIAGNOSIS — Z20.822 EXPOSURE TO 2019 NOVEL CORONAVIRUS: Primary | ICD-10-CM

## 2021-12-07 DIAGNOSIS — Z20.822 COVID-19 RULED OUT: ICD-10-CM

## 2021-12-07 PROCEDURE — C9803 HOPD COVID-19 SPEC COLLECT: HCPCS

## 2021-12-07 PROCEDURE — U0005 INFEC AGEN DETEC AMPLI PROBE: HCPCS | Mod: ZL

## 2021-12-07 NOTE — PROGRESS NOTES
Patient here for Covid Testing. Rule out for work.    Nichelle Bennett MA on 12/7/2021 at 3:56 PM

## 2021-12-09 LAB — SARS-COV-2 RNA RESP QL NAA+PROBE: POSITIVE

## 2022-06-26 ENCOUNTER — APPOINTMENT (OUTPATIENT)
Dept: GENERAL RADIOLOGY | Facility: OTHER | Age: 27
End: 2022-06-26
Attending: EMERGENCY MEDICINE
Payer: COMMERCIAL

## 2022-06-26 ENCOUNTER — HOSPITAL ENCOUNTER (EMERGENCY)
Facility: OTHER | Age: 27
Discharge: HOME | End: 2022-06-26
Attending: EMERGENCY MEDICINE | Admitting: EMERGENCY MEDICINE
Payer: COMMERCIAL

## 2022-06-26 VITALS
RESPIRATION RATE: 16 BRPM | BODY MASS INDEX: 23.63 KG/M2 | WEIGHT: 160 LBS | TEMPERATURE: 97.3 F | DIASTOLIC BLOOD PRESSURE: 76 MMHG | OXYGEN SATURATION: 96 % | HEART RATE: 75 BPM | SYSTOLIC BLOOD PRESSURE: 136 MMHG

## 2022-06-26 DIAGNOSIS — S93.401A SPRAIN OF RIGHT ANKLE, UNSPECIFIED LIGAMENT, INITIAL ENCOUNTER: ICD-10-CM

## 2022-06-26 PROCEDURE — 99283 EMERGENCY DEPT VISIT LOW MDM: CPT | Performed by: EMERGENCY MEDICINE

## 2022-06-26 PROCEDURE — 73610 X-RAY EXAM OF ANKLE: CPT | Mod: TC,RT

## 2022-06-26 PROCEDURE — 99282 EMERGENCY DEPT VISIT SF MDM: CPT | Performed by: EMERGENCY MEDICINE

## 2022-06-26 ASSESSMENT — ENCOUNTER SYMPTOMS
NAUSEA: 0
AGITATION: 0
DYSURIA: 0
SHORTNESS OF BREATH: 0
ARTHRALGIAS: 1
CHEST TIGHTNESS: 0
FEVER: 0
VOMITING: 0
CHILLS: 0
LIGHT-HEADEDNESS: 0

## 2022-06-26 NOTE — Clinical Note
Ariel Triplett was seen and treated in our emergency department on 6/26/2022.  He may return to work on 06/29/2022.  Will need light duty for at least 2 to 3 days.  If unable to return to full work load by midweek, he should follow-up in clinic for work release.     If you have any questions or concerns, please don't hesitate to call.      Pavan Puga MD

## 2022-06-27 NOTE — ED PROVIDER NOTES
History     Chief Complaint   Patient presents with     Ankle Pain     HPI  Ariel Triplett is a 27 year old male who was just walking last night and he felt a sudden pop and severe pain in his right ankle.  He points to the medial aspect just below his medial malleolus.  It is swollen and tender there.  He is able to walk on it.  Again there was no injury mechanism but it is very painful.  He works at [employer] and has to do a lot of lifting, so he wanted to make sure there was nothing broken.    Allergies:  Allergies   Allergen Reactions     Seasonal Allergies Other (See Comments)     Stuffy nose       Problem List:    Patient Active Problem List    Diagnosis Date Noted     Osteosarcoma (H) 08/23/2017     Priority: Medium        Past Medical History:    Past Medical History:   Diagnosis Date     Otitis media        Past Surgical History:    Past Surgical History:   Procedure Laterality Date     BIOPSY BONE LOWER EXTREMITY Right 8/28/2017    Procedure: BIOPSY BONE LOWER EXTREMITY;  Open Biopsy Right Distal Femur ;  Surgeon: Neymar Landers MD;  Location: [facility] OR     OTHER SURGICAL HISTORY      90241.0,PAST SURGICAL HISTORY,Unremarkable       Family History:    History reviewed. No pertinent family history.    Social History:  Marital Status:  Single [1]  Social History     Tobacco Use     Smoking status: Current Some Day Smoker     Smokeless tobacco: Never Used   Substance Use Topics     Alcohol use: Yes     Alcohol/week: 0.0 standard drinks     Comment: Alcoholic Drinks/day: once/week     Drug use: Unknown     Types: Other     Comment: Drug use: No        Medications:    acetaminophen (TYLENOL) 325 MG tablet  amoxicillin-clavulanate (AUGMENTIN) 875-125 MG tablet  cyclobenzaprine (FLEXERIL) 10 MG tablet  gabapentin (NEURONTIN) 100 MG capsule  HYDROcodone-acetaminophen (NORCO) 5-325 MG tablet  HYDROmorphone (DILAUDID) 2 MG tablet  hydrOXYzine (ATARAX) 25 MG tablet  ibuprofen (ADVIL/MOTRIN) 800 MG  tablet  ketorolac (TORADOL) 10 MG tablet  multivitamin w/minerals (THERA-VIT-M) tablet  oxyCODONE (ROXICODONE) 5 MG IR tablet  senna-docusate (SENOKOT-S;PERICOLACE) 8.6-50 MG per tablet          Review of Systems   Constitutional: Negative for chills and fever.   HENT: Negative for congestion.    Eyes: Negative for visual disturbance.   Respiratory: Negative for chest tightness and shortness of breath.    Cardiovascular: Negative for chest pain.   Gastrointestinal: Negative for nausea and vomiting.   Genitourinary: Negative for dysuria.   Musculoskeletal: Positive for arthralgias.   Skin: Negative for rash.   Neurological: Negative for light-headedness.   Psychiatric/Behavioral: Negative for agitation.       Physical Exam   BP: 136/76  Pulse: 75  Temp: 97.3  F (36.3  C)  Resp: 16  Weight: 72.6 kg (160 lb)  SpO2: 96 %      Physical Exam  Vitals and nursing note reviewed.   Constitutional:       Appearance: Normal appearance.   HENT:      Head: Normocephalic and atraumatic.      Mouth/Throat:      Mouth: Mucous membranes are moist.   Eyes:      Conjunctiva/sclera: Conjunctivae normal.   Cardiovascular:      Rate and Rhythm: Normal rate.   Pulmonary:      Effort: Pulmonary effort is normal.   Musculoskeletal:      Comments: He has tenderness to palpation and some swelling below the medial malleolus on the right ankle.  Neurovascularly intact.   Skin:     General: Skin is warm and dry.   Neurological:      Mental Status: He is alert and oriented to person, place, and time.   Psychiatric:         Mood and Affect: Mood normal.         Behavior: Behavior normal.         ED Course                 Procedures                  No results found for this or any previous visit (from the past 24 hour(s)).    Medications - No data to display    Assessments & Plan (with Medical Decision Making)     I have reviewed the nursing notes.    I have reviewed the findings, diagnosis, plan and need for follow up with the patient.  Patient  with sprained ankle.  X-ray has been resulted, but is not populating in the chart.  Radiology has read this as no acute findings.  I do not see any acute fracture or dislocation on my view either.  Will be given an ankle brace.  He should use ice elevation ibuprofen.  Light duty at work until he is feeling able.  If he is not able to return to full duties by midweek, I recommended setting up a clinic appointment for work release.    New Prescriptions    No medications on file       Final diagnoses:   Sprain of right ankle, unspecified ligament, initial encounter       6/26/2022   [facility]     Pavan Puga MD  06/26/22 2058

## 2022-06-27 NOTE — ED TRIAGE NOTES
Pt presents to ED from home for c/o right ankle pain. States he twisted ankle last night and heard a pop, increased pain today with movement.  /76   Pulse 75   Temp 97.3  F (36.3  C) (Tympanic)   Resp 16   Wt 72.6 kg (160 lb)   SpO2 96%   BMI 23.63 kg/m         Triage Assessment     Row Name 06/26/22 1923       Triage Assessment (Adult)    Airway WDL WDL       Respiratory WDL    Respiratory WDL WDL       Skin Circulation/Temperature WDL    Skin Circulation/Temperature WDL WDL       Cardiac WDL    Cardiac WDL WDL       Peripheral/Neurovascular WDL    Peripheral Neurovascular WDL WDL       Cognitive/Neuro/Behavioral WDL    Cognitive/Neuro/Behavioral WDL WDL       [place] Coma Scale    Best Eye Response 4-->(E4) spontaneous    Best Motor Response 6-->(M6) obeys commands    Best Verbal Response 5-->(V5) oriented    Conrad Coma Scale Score 15

## 2022-06-27 NOTE — DISCHARGE INSTRUCTIONS
Use ice elevation ibuprofen.  You can alternate Tylenol and ibuprofen every 3 hours.  If you are up to it you can return to work when the pain is better, however if not feeling able to return to full duty by midweek, I would recommend following up in clinic for further evaluation and work release.

## 2022-09-17 ENCOUNTER — HEALTH MAINTENANCE LETTER (OUTPATIENT)
Age: 27
End: 2022-09-17

## 2022-11-26 ENCOUNTER — OFFICE VISIT (OUTPATIENT)
Dept: FAMILY MEDICINE | Facility: OTHER | Age: 27
End: 2022-11-26
Attending: PHYSICIAN ASSISTANT
Payer: COMMERCIAL

## 2022-11-26 VITALS
SYSTOLIC BLOOD PRESSURE: 132 MMHG | DIASTOLIC BLOOD PRESSURE: 80 MMHG | RESPIRATION RATE: 20 BRPM | HEART RATE: 82 BPM | OXYGEN SATURATION: 98 % | HEIGHT: 68 IN | TEMPERATURE: 97.8 F | BODY MASS INDEX: 25.69 KG/M2 | WEIGHT: 169.5 LBS

## 2022-11-26 DIAGNOSIS — Z20.822 SUSPECTED 2019 NOVEL CORONAVIRUS INFECTION: ICD-10-CM

## 2022-11-26 DIAGNOSIS — J06.9 VIRAL URI WITH COUGH: Primary | ICD-10-CM

## 2022-11-26 LAB
FLUAV RNA SPEC QL NAA+PROBE: NEGATIVE
FLUBV RNA RESP QL NAA+PROBE: NEGATIVE
RSV RNA SPEC NAA+PROBE: POSITIVE
SARS-COV-2 RNA RESP QL NAA+PROBE: NEGATIVE

## 2022-11-26 PROCEDURE — C9803 HOPD COVID-19 SPEC COLLECT: HCPCS

## 2022-11-26 PROCEDURE — 99213 OFFICE O/P EST LOW 20 MIN: CPT | Mod: CS | Performed by: STUDENT IN AN ORGANIZED HEALTH CARE EDUCATION/TRAINING PROGRAM

## 2022-11-26 PROCEDURE — 87637 SARSCOV2&INF A&B&RSV AMP PRB: CPT | Mod: ZL | Performed by: STUDENT IN AN ORGANIZED HEALTH CARE EDUCATION/TRAINING PROGRAM

## 2022-11-26 PROCEDURE — G0463 HOSPITAL OUTPT CLINIC VISIT: HCPCS

## 2022-11-26 RX ORDER — FLUTICASONE PROPIONATE 50 MCG
1 SPRAY, SUSPENSION (ML) NASAL DAILY
Qty: 11 ML | Refills: 1 | Status: SHIPPED | OUTPATIENT
Start: 2022-11-26 | End: 2023-02-10

## 2022-11-26 RX ORDER — BENZONATATE 100 MG/1
100 CAPSULE ORAL 3 TIMES DAILY PRN
Qty: 30 CAPSULE | Refills: 1 | Status: SHIPPED | OUTPATIENT
Start: 2022-11-26 | End: 2023-02-10

## 2022-11-26 RX ORDER — PSEUDOEPHEDRINE HCL 30 MG
30 TABLET ORAL EVERY 4 HOURS PRN
Qty: 20 TABLET | Refills: 1 | Status: SHIPPED | OUTPATIENT
Start: 2022-11-26 | End: 2023-02-10

## 2022-11-26 ASSESSMENT — PAIN SCALES - GENERAL: PAINLEVEL: NO PAIN (0)

## 2022-11-26 NOTE — NURSING NOTE
"Chief Complaint   Patient presents with     Cough     Sinus Problem     drainage     Chest heaviness and burning in lungs x 2 days.      Initial /80   Pulse 82   Temp 97.8  F (36.6  C) (Tympanic)   Resp 20   Ht 1.721 m (5' 7.75\")   Wt 76.9 kg (169 lb 8 oz)   SpO2 98%   BMI 25.96 kg/m   Estimated body mass index is 25.96 kg/m  as calculated from the following:    Height as of this encounter: 1.721 m (5' 7.75\").    Weight as of this encounter: 76.9 kg (169 lb 8 oz).         Norma J. Gosselin, FANY   "

## 2022-11-26 NOTE — PROGRESS NOTES
"  Assessment & Plan     Viral URI with cough  RSV positive. No signs or symptoms of bacterial infection. Discussed supportive cares at home and natural course of viral illness. Reviewed masking and good hand hygiene   - fluticasone (FLONASE) 50 MCG/ACT nasal spray; Spray 1 spray into both nostrils daily  - benzonatate (TESSALON) 100 MG capsule; Take 1 capsule (100 mg) by mouth 3 times daily as needed for cough  - sudafed    Suspected 2019 novel coronavirus infection  - Symptomatic; Yes; 11/20/2022 Influenza A/B & SARS-CoV2 (COVID-19) Virus PCR Multiplex Nose             Nicotine/Tobacco Cessation:  He reports that he has been smoking. He has never used smokeless tobacco.  Nicotine/Tobacco Cessation Plan:   Information offered: Patient not interested at this time      BMI:   Estimated body mass index is 25.96 kg/m  as calculated from the following:    Height as of this encounter: 1.721 m (5' 7.75\").    Weight as of this encounter: 76.9 kg (169 lb 8 oz).           No follow-ups on file.    Orestes Dewitt MD  [facility]    [facility]   Ariel is a 27 year old, presenting for the following health issues:  Cough and Sinus Problem (drainage)      HPI     Cold symptoms   - started 2 days ago  - cough, worsening   - green productive sputum  - heaviness and burning sensation in lungs  - diarrhea started 4 days ago   - drainage from nose down back of throat   - all progressively worsening   - supportive cares at home--cough drops, robitussin, teas  - no known exposure to influenza or COVID, works at [employer] so exposed to people daily           Review of Systems   Constitutional, HEENT, cardiovascular, pulmonary, gi and gu systems are negative, except as otherwise noted.      Objective    /80   Pulse 82   Temp 97.8  F (36.6  C) (Tympanic)   Resp 20   Ht 1.721 m (5' 7.75\")   Wt 76.9 kg (169 lb 8 oz)   SpO2 98%   BMI 25.96 kg/m    Body mass index is 25.96 kg/m .  Physical Exam "   GENERAL: healthy, alert and no distress  EYES: Eyes grossly normal to inspection, PERRL and conjunctivae and sclerae normal  HENT: normal cephalic/atraumatic, both ears: clear effusion, oropharynx clear and oral mucous membranes moist  RESP: lungs clear to auscultation - no rales, rhonchi or wheezes  CV: regular rate and rhythm, normal S1 S2, no S3 or S4, no murmur, click or rub, no peripheral edema and peripheral pulses strong  ABDOMEN: soft, nontender, no hepatosplenomegaly, no masses and bowel sounds normal  PSYCH: mentation appears normal, affect normal/bright    Results for orders placed or performed in visit on 11/26/22 (from the past 24 hour(s))   Symptomatic; Yes; 11/20/2022 Influenza A/B & SARS-CoV2 (COVID-19) Virus PCR Multiplex Nose    Specimen: Nose; Swab   Result Value Ref Range    Influenza A PCR Negative Negative    Influenza B PCR Negative Negative    RSV PCR Positive (A) Negative    SARS CoV2 PCR Negative Negative    Narrative    Testing was performed using the Xpert Xpress CoV2/Flu/RSV Assay on the Absolute Antib[employer] GeneXpert Instrument. This test should be ordered for the detection of SARS-CoV-2 and influenza viruses in individuals who meet clinical and/or epidemiological criteria. Test performance is unknown in asymptomatic patients. This test is for in vitro diagnostic use under the FDA EUA for laboratories certified under CLIA to perform high or moderate complexity testing. This test has not been FDA cleared or approved. A negative result does not rule out the presence of PCR inhibitors in the specimen or target RNA in concentration below the limit of detection for the assay. If only one viral target is positive but coinfection with multiple targets is suspected, the sample should be re-tested with another FDA cleared, approved, or authorized test, if coinfection would change clinical management. This test was validated by the [facility]. These laboratories are certified under the  Clinical Laboratory Improvement Amendments of 1988 (CLIA-88) as qualified to perform high complexity laboratory testing.

## 2022-11-26 NOTE — RESULT ENCOUNTER NOTE
Team - please call patient with results.  RSV positive. Covid and flu negative. This is the virus and likely cause of your viral upper respiratory infection. Please wear a mask and practice good hand hygiene until symptoms are resolved

## 2022-12-05 ENCOUNTER — HOSPITAL ENCOUNTER (EMERGENCY)
Facility: OTHER | Age: 27
Discharge: HOME | End: 2022-12-05
Attending: STUDENT IN AN ORGANIZED HEALTH CARE EDUCATION/TRAINING PROGRAM | Admitting: STUDENT IN AN ORGANIZED HEALTH CARE EDUCATION/TRAINING PROGRAM
Payer: OTHER MISCELLANEOUS

## 2022-12-05 ENCOUNTER — APPOINTMENT (OUTPATIENT)
Dept: GENERAL RADIOLOGY | Facility: OTHER | Age: 27
End: 2022-12-05
Attending: STUDENT IN AN ORGANIZED HEALTH CARE EDUCATION/TRAINING PROGRAM
Payer: OTHER MISCELLANEOUS

## 2022-12-05 VITALS
TEMPERATURE: 98.5 F | HEART RATE: 82 BPM | RESPIRATION RATE: 20 BRPM | BODY MASS INDEX: 25.9 KG/M2 | DIASTOLIC BLOOD PRESSURE: 80 MMHG | HEIGHT: 67 IN | SYSTOLIC BLOOD PRESSURE: 138 MMHG | OXYGEN SATURATION: 97 % | WEIGHT: 165 LBS

## 2022-12-05 DIAGNOSIS — S56.219A: ICD-10-CM

## 2022-12-05 PROCEDURE — 99283 EMERGENCY DEPT VISIT LOW MDM: CPT | Performed by: STUDENT IN AN ORGANIZED HEALTH CARE EDUCATION/TRAINING PROGRAM

## 2022-12-05 PROCEDURE — 250N000013 HC RX MED GY IP 250 OP 250 PS 637: Performed by: STUDENT IN AN ORGANIZED HEALTH CARE EDUCATION/TRAINING PROGRAM

## 2022-12-05 PROCEDURE — 73110 X-RAY EXAM OF WRIST: CPT | Mod: TC,LT

## 2022-12-05 PROCEDURE — 99282 EMERGENCY DEPT VISIT SF MDM: CPT | Performed by: STUDENT IN AN ORGANIZED HEALTH CARE EDUCATION/TRAINING PROGRAM

## 2022-12-05 RX ORDER — OLANZAPINE 10 MG/2ML
5 INJECTION, POWDER, FOR SOLUTION INTRAMUSCULAR DAILY PRN
Status: DISCONTINUED | OUTPATIENT
Start: 2022-12-05 | End: 2022-12-05

## 2022-12-05 RX ADMIN — IBUPROFEN 600 MG: 200 TABLET, FILM COATED ORAL at 03:03

## 2022-12-05 ASSESSMENT — ACTIVITIES OF DAILY LIVING (ADL): ADLS_ACUITY_SCORE: 35
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
## 2022-12-05 NOTE — Clinical Note
Ariel Triplett was seen and treated in our emergency department on 12/5/2022.  He may return to work on 12/07/2022.  Mr. Triplett should not perform heavy physical labor with his left arm and will need to wear a splint at work to prevent additional injury until symptoms improve or he is cleared to go back to full work by his orthopedic provider.     If you have any questions or concerns, please don't hesitate to call.      Ariel Ghosh MD

## 2022-12-05 NOTE — ED PROVIDER NOTES
Emergency Department Provider Note  : 1995 Age: 27 year old Sex: male MRN: [id number]    Chief Complaint   Patient presents with     Wrist Pain     Left         Medical Decision Making / Assessment / Plan   27 year old male with a PMH of prior left distal radius fracture who presents with acute onset pain after reported popping sensation over the palmar aspect of the left wrist.  X-ray obtained prior to me meeting the patient demonstrates no evidence of acute bony deformity and good hardware placement along the distal left radius.  Exam shows no evidence of edema palpable deformity and is able to fully range with good strength his wrist.  There is pain with resisted flexion at the wrist and with passive extension suspicious for flexor tendon strain but certainly it is no evidence of rupture.  CMS intact and no suspicion for neurologic or vascular injury.  We will plan on using immobilization via a removable thumb spica splint, ice, NSAIDs for management of symptoms.  We will have the patient slowly rehab his wrist and follow-up with orthopedics if symptoms persist or worsen.          The patient was informed of the plan and verbalized understanding and agreed with the plan. The patient was given strict return to Emergency Department precautions as well as appropriate follow up instructions. The patient was discharged in stable condition.    New Prescriptions    No medications on file       Final diagnoses:   Strain of flexor tendon at forearm level       Ariel Ghosh MD  2022   Emergency Department    Subjective   Ariel is a 27 year old male with PMH of left distal radius fx who presents at  1:50 AM for evaluation of acute onset left forearm pain along the palmar surface after moving a grill at work shortly prior to arrival.  Reports that he felt a pop and felt immediate symptoms of pain.  Describes some tingling and numbness in the left third, fourth, and fifth digits distally.  No edema.  No  "medications taken prior to coming in. No other sx or complaints.    I have reviewed the Medications, Allergies, Past Medical and Surgical History, and Social History in the Epic System and with family.    Review of Systems:  Please see HPI for pertinent positives and negatives. All other systems reviewed and found to be negative.      Objective   BP: 138/80  Pulse: 82  Temp: 98.5  F (36.9  C)  Resp: 20  Height: 170.8 cm (5' 7.25\")  Weight: 74.8 kg (165 lb)  SpO2: 97 %    Physical Exam:   Gen: Comfortable. NAD  HEENT: MMM. AT/NC.  Eye: EOMI.   CV:  Intact left radial pulse.  Well-perfused throughout the entire left upper extremity.  Pulm: Nonlabored, equal chest rise  Abd: ND.   Ext:  Unremarkable appearing left upper extremity other than for well healed surgical scar over the palmar wrist.  Patient is able to range his wrist but with pain focally over the region of the flexor sheath and more specifically over the region of the flexor carpi radialis where there is no associated edema or palpable deformity.  There is pain to palpation of this area and certainly pain with resisted flexion at the wrist as well as with passive extension.  No pain to palpation of the carpal bones or dorsally or laterally over the wrist.  Neuro:  Subjective mildly decreased sensation over the left third, fourth, and fifth digits but sensation is intact touch and pain in this area.  Motor and sensation otherwise grossly intact.  Psych: Appropriate affect, cognition intact    Procedures / Critical Care   Procedures    Critical Care Time: none         Medical/Surgical History:  Past Medical History:   Diagnosis Date     Otitis media     3/4/05,L     Past Surgical History:   Procedure Laterality Date     BIOPSY BONE LOWER EXTREMITY Right 8/28/2017    Procedure: BIOPSY BONE LOWER EXTREMITY;  Open Biopsy Right Distal Femur ;  Surgeon: Neymar Landers MD;  Location: [facility] OR     OTHER SURGICAL HISTORY      04760.0,PAST SURGICAL " HISTORY,Unremarkable       Medications:  Current Facility-Administered Medications   Medication     ibuprofen (ADVIL/MOTRIN) tablet 600 mg     Current Outpatient Medications   Medication     acetaminophen (TYLENOL) 325 MG tablet     amoxicillin-clavulanate (AUGMENTIN) 875-125 MG tablet     benzonatate (TESSALON) 100 MG capsule     cyclobenzaprine (FLEXERIL) 10 MG tablet     fluticasone (FLONASE) 50 MCG/ACT nasal spray     gabapentin (NEURONTIN) 100 MG capsule     HYDROcodone-acetaminophen (NORCO) 5-325 MG tablet     HYDROmorphone (DILAUDID) 2 MG tablet     hydrOXYzine (ATARAX) 25 MG tablet     ibuprofen (ADVIL/MOTRIN) 800 MG tablet     ketorolac (TORADOL) 10 MG tablet     multivitamin w/minerals (THERA-VIT-M) tablet     oxyCODONE (ROXICODONE) 5 MG IR tablet     pseudoePHEDrine (SUDAFED) 30 MG tablet     senna-docusate (SENOKOT-S;PERICOLACE) 8.6-50 MG per tablet       Allergies:  Seasonal allergies    Relevant labs, images, EKGs, Epic and outside hospital (if applicable) charts were reviewed. The findings, diagnosis, plan, and need for follow up were discussed with the patient/family. Nursing notes were reviewed.

## 2022-12-05 NOTE — DISCHARGE INSTRUCTIONS
You are seen today for what appears to be a strain around the flexor tendons in your wrist.  Please wear the Velcro splint throughout the day for roughly the next week to immobilize this area and prevent agitating the strained tendon.  Apply ice to the affected area multiple times and use ibuprofen and Tylenol for management of inflammation and pain.  Over the next several days, in small increments try to range your left wrist out of the splint. Follow-up with orthopedics if symptoms not improving within 1 week.

## 2023-01-23 ENCOUNTER — HEALTH MAINTENANCE LETTER (OUTPATIENT)
Age: 28
End: 2023-01-23

## 2023-02-10 ENCOUNTER — OFFICE VISIT (OUTPATIENT)
Dept: FAMILY MEDICINE | Facility: OTHER | Age: 28
End: 2023-02-10
Payer: COMMERCIAL

## 2023-02-10 VITALS
SYSTOLIC BLOOD PRESSURE: 110 MMHG | BODY MASS INDEX: 24.71 KG/M2 | HEIGHT: 67 IN | WEIGHT: 157.44 LBS | HEART RATE: 62 BPM | RESPIRATION RATE: 20 BRPM | OXYGEN SATURATION: 97 % | TEMPERATURE: 97 F | DIASTOLIC BLOOD PRESSURE: 58 MMHG

## 2023-02-10 DIAGNOSIS — R42 DIZZINESS: Primary | ICD-10-CM

## 2023-02-10 DIAGNOSIS — R53.83 FATIGUE, UNSPECIFIED TYPE: ICD-10-CM

## 2023-02-10 LAB
ALBUMIN SERPL BCG-MCNC: 4.5 G/DL (ref 3.5–5.2)
ALP SERPL-CCNC: 64 U/L (ref 40–129)
ALT SERPL W P-5'-P-CCNC: 24 U/L (ref 10–50)
ANION GAP SERPL CALCULATED.3IONS-SCNC: 11 MMOL/L (ref 7–15)
AST SERPL W P-5'-P-CCNC: 16 U/L (ref 10–50)
BASOPHILS # BLD AUTO: 0 10E3/UL (ref 0–0.2)
BASOPHILS NFR BLD AUTO: 1 %
BILIRUB SERPL-MCNC: 0.6 MG/DL
BUN SERPL-MCNC: 14.9 MG/DL (ref 6–20)
CALCIUM SERPL-MCNC: 9.4 MG/DL (ref 8.6–10)
CHLORIDE SERPL-SCNC: 106 MMOL/L (ref 98–107)
CREAT SERPL-MCNC: 0.87 MG/DL (ref 0.67–1.17)
DEPRECATED HCO3 PLAS-SCNC: 25 MMOL/L (ref 22–29)
EOSINOPHIL # BLD AUTO: 0.1 10E3/UL (ref 0–0.7)
EOSINOPHIL NFR BLD AUTO: 2 %
ERYTHROCYTE [DISTWIDTH] IN BLOOD BY AUTOMATED COUNT: 12.4 % (ref 10–15)
GFR SERPL CREATININE-BSD FRML MDRD: >90 ML/MIN/1.73M2
GLUCOSE SERPL-MCNC: 129 MG/DL (ref 70–99)
HCT VFR BLD AUTO: 46.9 % (ref 40–53)
HGB BLD-MCNC: 16.4 G/DL (ref 13.3–17.7)
IMM GRANULOCYTES # BLD: 0 10E3/UL
IMM GRANULOCYTES NFR BLD: 0 %
LYMPHOCYTES # BLD AUTO: 1.3 10E3/UL (ref 0.8–5.3)
LYMPHOCYTES NFR BLD AUTO: 26 %
MCH RBC QN AUTO: 31 PG (ref 26.5–33)
MCHC RBC AUTO-ENTMCNC: 35 G/DL (ref 31.5–36.5)
MCV RBC AUTO: 89 FL (ref 78–100)
MONOCYTES # BLD AUTO: 0.4 10E3/UL (ref 0–1.3)
MONOCYTES NFR BLD AUTO: 9 %
NEUTROPHILS # BLD AUTO: 3.2 10E3/UL (ref 1.6–8.3)
NEUTROPHILS NFR BLD AUTO: 62 %
NRBC # BLD AUTO: 0 10E3/UL
NRBC BLD AUTO-RTO: 0 /100
PLATELET # BLD AUTO: 230 10E3/UL (ref 150–450)
POTASSIUM SERPL-SCNC: 4.3 MMOL/L (ref 3.4–5.3)
PROT SERPL-MCNC: 6.5 G/DL (ref 6.4–8.3)
RBC # BLD AUTO: 5.29 10E6/UL (ref 4.4–5.9)
SODIUM SERPL-SCNC: 142 MMOL/L (ref 136–145)
TSH SERPL DL<=0.005 MIU/L-ACNC: 1.56 UIU/ML (ref 0.3–4.2)
WBC # BLD AUTO: 5.1 10E3/UL (ref 4–11)

## 2023-02-10 PROCEDURE — G0463 HOSPITAL OUTPT CLINIC VISIT: HCPCS

## 2023-02-10 PROCEDURE — 99213 OFFICE O/P EST LOW 20 MIN: CPT | Performed by: FAMILY MEDICINE

## 2023-02-10 PROCEDURE — 85025 COMPLETE CBC W/AUTO DIFF WBC: CPT | Mod: ZL | Performed by: FAMILY MEDICINE

## 2023-02-10 PROCEDURE — 36415 COLL VENOUS BLD VENIPUNCTURE: CPT | Mod: ZL | Performed by: FAMILY MEDICINE

## 2023-02-10 PROCEDURE — 84443 ASSAY THYROID STIM HORMONE: CPT | Mod: ZL | Performed by: FAMILY MEDICINE

## 2023-02-10 PROCEDURE — 80053 COMPREHEN METABOLIC PANEL: CPT | Mod: ZL | Performed by: FAMILY MEDICINE

## 2023-02-10 ASSESSMENT — PAIN SCALES - GENERAL: PAINLEVEL: MILD PAIN (2)

## 2023-02-11 NOTE — NURSING NOTE
Patient presents to clinic experiencing dizziness, abdominal cramps, emesis, lethargy and insomnia for past month.    Medication Rec Complete  Karina Locke LPN............2/10/2023 6:27 PM

## 2023-02-11 NOTE — PROGRESS NOTES
"SUBJECTIVE:  Ariel Triplett is a 27 year old male here for dizziness and fatigue.  He reports he has had this for several weeks.  He is not aware of any change in activity, travel, symptoms that may have brought this on.  He was diagnosed with RSV earlier this winter.  At this time he is not using any medications.  He continues to smoke but has cut down significantly.  No alcohol or other drug use.  No significant travel.  He has had frequent loose stools, no blood or mucus.  No urinary changes.  He feels lightheaded when he bends over and occasionally feels nauseated.    Allergies:  Allergies   Allergen Reactions     Seasonal Allergies Other (See Comments)     Stuffy nose       ROS:    As above otherwise ROS is unremarkable.    OBJECTIVE:  /58 (BP Location: Left arm, Patient Position: Sitting, Cuff Size: Adult Regular)   Pulse 62   Temp 97  F (36.1  C) (Tympanic)   Resp 20   Ht 1.708 m (5' 7.25\")   Wt 71.4 kg (157 lb 7 oz)   SpO2 97%   BMI 24.48 kg/m      EXAM:  General Appearance: Pleasant, alert, appropriate appearance for age. No acute distress  Head: Normal. Normocephalic, atraumatic.  Eyes: PERRL, EOMI  Ears: Normal TM's bilaterally. Normal auditory canals and external ears.   OroPharynx: Dental hygiene adequate. Normal buccal mucosa. Normal pharynx.  Neck: Supple, no masses or nodes, no lymphadenopathy.  No thyromegaly.  Lungs: Normal chest wall and respirations. Clear to auscultation, no wheezes or crackles.  Cardiovascular: Regular rate and rhythm. S1, S2, no murmurs.  Gastrointestinal: Soft, nontender, no abnormal masses or organomegaly. BS normal.  Musculoskeletal: No edema.  Skin: no concerning or new rashes.  Neurologic Exam: CN 2-12 grossly intact.    Psychiatric Exam: Alert and oriented, appropriate affect.    ASSESSEMENT AND PLAN:    1. Dizziness    2. Fatigue, unspecified type      Exam is relatively unremarkable.  Will check TSH, CMP and CBC.  If all these are normal recommend " increasing fluid intake.  Consider increasing salt intake slightly as he does have low blood pressure here and occasional blood pressure at home.  Strive symptoms or not improving would recommend he follow-up in clinic for further assessment.    Asif Ashraf MD  Family Medicine

## 2023-03-07 ENCOUNTER — APPOINTMENT (OUTPATIENT)
Dept: GENERAL RADIOLOGY | Facility: OTHER | Age: 28
End: 2023-03-07
Attending: STUDENT IN AN ORGANIZED HEALTH CARE EDUCATION/TRAINING PROGRAM
Payer: COMMERCIAL

## 2023-03-07 ENCOUNTER — HOSPITAL ENCOUNTER (EMERGENCY)
Facility: OTHER | Age: 28
Discharge: HOME | End: 2023-03-07
Attending: STUDENT IN AN ORGANIZED HEALTH CARE EDUCATION/TRAINING PROGRAM | Admitting: STUDENT IN AN ORGANIZED HEALTH CARE EDUCATION/TRAINING PROGRAM
Payer: COMMERCIAL

## 2023-03-07 VITALS
TEMPERATURE: 97 F | SYSTOLIC BLOOD PRESSURE: 126 MMHG | OXYGEN SATURATION: 99 % | RESPIRATION RATE: 20 BRPM | HEART RATE: 122 BPM | BODY MASS INDEX: 23.86 KG/M2 | HEIGHT: 67 IN | WEIGHT: 152 LBS | DIASTOLIC BLOOD PRESSURE: 110 MMHG

## 2023-03-07 DIAGNOSIS — S71.131A: ICD-10-CM

## 2023-03-07 PROCEDURE — 73552 X-RAY EXAM OF FEMUR 2/>: CPT | Mod: RT

## 2023-03-07 PROCEDURE — 90715 TDAP VACCINE 7 YRS/> IM: CPT | Performed by: STUDENT IN AN ORGANIZED HEALTH CARE EDUCATION/TRAINING PROGRAM

## 2023-03-07 PROCEDURE — 250N000009 HC RX 250: Performed by: STUDENT IN AN ORGANIZED HEALTH CARE EDUCATION/TRAINING PROGRAM

## 2023-03-07 PROCEDURE — 96374 THER/PROPH/DIAG INJ IV PUSH: CPT | Performed by: STUDENT IN AN ORGANIZED HEALTH CARE EDUCATION/TRAINING PROGRAM

## 2023-03-07 PROCEDURE — 99285 EMERGENCY DEPT VISIT HI MDM: CPT | Mod: 25 | Performed by: STUDENT IN AN ORGANIZED HEALTH CARE EDUCATION/TRAINING PROGRAM

## 2023-03-07 PROCEDURE — 99283 EMERGENCY DEPT VISIT LOW MDM: CPT | Performed by: STUDENT IN AN ORGANIZED HEALTH CARE EDUCATION/TRAINING PROGRAM

## 2023-03-07 PROCEDURE — 73502 X-RAY EXAM HIP UNI 2-3 VIEWS: CPT

## 2023-03-07 PROCEDURE — 90471 IMMUNIZATION ADMIN: CPT | Performed by: STUDENT IN AN ORGANIZED HEALTH CARE EDUCATION/TRAINING PROGRAM

## 2023-03-07 PROCEDURE — 250N000011 HC RX IP 250 OP 636: Performed by: STUDENT IN AN ORGANIZED HEALTH CARE EDUCATION/TRAINING PROGRAM

## 2023-03-07 RX ORDER — LORAZEPAM 2 MG/ML
1 INJECTION INTRAMUSCULAR ONCE
Status: COMPLETED | OUTPATIENT
Start: 2023-03-07 | End: 2023-03-07

## 2023-03-07 RX ORDER — GINSENG 100 MG
500 CAPSULE ORAL ONCE
Status: COMPLETED | OUTPATIENT
Start: 2023-03-07 | End: 2023-03-07

## 2023-03-07 RX ADMIN — BACITRACIN 1 G: 500 OINTMENT TOPICAL at 13:38

## 2023-03-07 RX ADMIN — TETANUS TOXOID, REDUCED DIPHTHERIA TOXOID AND ACELLULAR PERTUSSIS VACCINE, ADSORBED 0.5 ML: 5; 2.5; 8; 8; 2.5 SUSPENSION INTRAMUSCULAR at 12:58

## 2023-03-07 RX ADMIN — LORAZEPAM 1 MG: 2 INJECTION INTRAMUSCULAR; INTRAVENOUS at 13:27

## 2023-03-07 ASSESSMENT — ACTIVITIES OF DAILY LIVING (ADL): ADLS_ACUITY_SCORE: 35
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
## 2023-03-07 NOTE — PROGRESS NOTES
Wound cleaned with saline, bacitracin, and dressing applied.     Discharge education provided. Pt educated to shower and let soapy water run over wound and to avoid a bathtub/submerging in water for the next 7-10 days. Pt also encouraged to purchase antibiotic ointment and apply to cleaned wound for the next 3-5 days.

## 2023-03-14 ENCOUNTER — OFFICE VISIT (OUTPATIENT)
Dept: FAMILY MEDICINE | Facility: OTHER | Age: 28
End: 2023-03-14
Payer: COMMERCIAL

## 2023-03-14 VITALS
SYSTOLIC BLOOD PRESSURE: 104 MMHG | DIASTOLIC BLOOD PRESSURE: 70 MMHG | BODY MASS INDEX: 24.37 KG/M2 | TEMPERATURE: 97 F | WEIGHT: 155.3 LBS | OXYGEN SATURATION: 99 % | HEIGHT: 67 IN | HEART RATE: 64 BPM | RESPIRATION RATE: 16 BRPM

## 2023-03-14 DIAGNOSIS — S71.131D GUNSHOT WOUND OF RIGHT THIGH, SUBSEQUENT ENCOUNTER: Primary | ICD-10-CM

## 2023-03-14 DIAGNOSIS — Z51.89 ENCOUNTER FOR WOUND CARE: ICD-10-CM

## 2023-03-14 PROCEDURE — 250N000013 HC RX MED GY IP 250 OP 250 PS 637: Performed by: NURSE PRACTITIONER

## 2023-03-14 PROCEDURE — 99213 OFFICE O/P EST LOW 20 MIN: CPT | Performed by: NURSE PRACTITIONER

## 2023-03-14 PROCEDURE — 87070 CULTURE OTHR SPECIMN AEROBIC: CPT | Mod: ZL | Performed by: NURSE PRACTITIONER

## 2023-03-14 PROCEDURE — G0463 HOSPITAL OUTPT CLINIC VISIT: HCPCS

## 2023-03-14 RX ORDER — CEPHALEXIN 500 MG/1
500 CAPSULE ORAL 3 TIMES DAILY
Qty: 21 CAPSULE | Refills: 0 | Status: SHIPPED | OUTPATIENT
Start: 2023-03-14 | End: 2023-03-21

## 2023-03-14 RX ORDER — NEOMYCIN/BACITRACIN/POLYMYXINB 3.5-400-5K
OINTMENT (GRAM) TOPICAL 4 TIMES DAILY
Status: ACTIVE | OUTPATIENT
Start: 2023-03-14

## 2023-03-14 RX ADMIN — BACITRACIN ZINC, NEOMYCIN, POLYMYXIN B SULFAT: 5000; 3.5; 4 OINTMENT TOPICAL at 19:27

## 2023-03-14 ASSESSMENT — PAIN SCALES - GENERAL: PAINLEVEL: SEVERE PAIN (7)
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
## 2023-03-15 NOTE — PATIENT INSTRUCTIONS
Start antibiotics as prescribed     Wash twice daily with soap - alternate Dial and Surgical scrub    Air dry after washing then cover with dry guaze bandage.      Start Ibuprofen 2 to 3 tabs every 6 hours as needed for pain and swelling    Ice for 5 minutes around the area twice daily as needed for pain and swelling    Activities as tolerated     Follow up if wound is not healing or concerns for worsening infection   Follow up if further concerns

## 2023-03-16 LAB — BACTERIA WND CULT: NO GROWTH

## 2023-03-24 ENCOUNTER — NURSE TRIAGE (OUTPATIENT)
Dept: NURSING | Facility: CLINIC | Age: 28
End: 2023-03-24
Payer: COMMERCIAL

## 2023-03-24 ENCOUNTER — HOSPITAL ENCOUNTER (EMERGENCY)
Facility: OTHER | Age: 28
Discharge: HOME | End: 2023-03-24
Attending: PHYSICIAN ASSISTANT | Admitting: PHYSICIAN ASSISTANT
Payer: COMMERCIAL

## 2023-03-24 VITALS
SYSTOLIC BLOOD PRESSURE: 104 MMHG | DIASTOLIC BLOOD PRESSURE: 60 MMHG | RESPIRATION RATE: 15 BRPM | HEIGHT: 67 IN | HEART RATE: 52 BPM | OXYGEN SATURATION: 99 % | TEMPERATURE: 96.7 F | WEIGHT: 155 LBS | BODY MASS INDEX: 24.33 KG/M2

## 2023-03-24 DIAGNOSIS — R55 SYNCOPE: ICD-10-CM

## 2023-03-24 LAB
ANION GAP SERPL CALCULATED.3IONS-SCNC: 8 MMOL/L (ref 7–15)
BASOPHILS # BLD AUTO: 0 10E3/UL (ref 0–0.2)
BASOPHILS NFR BLD AUTO: 1 %
BUN SERPL-MCNC: 11.1 MG/DL (ref 6–20)
CALCIUM SERPL-MCNC: 9.4 MG/DL (ref 8.6–10)
CHLORIDE SERPL-SCNC: 103 MMOL/L (ref 98–107)
CREAT SERPL-MCNC: 0.87 MG/DL (ref 0.67–1.17)
DEPRECATED HCO3 PLAS-SCNC: 29 MMOL/L (ref 22–29)
EOSINOPHIL # BLD AUTO: 0.1 10E3/UL (ref 0–0.7)
EOSINOPHIL NFR BLD AUTO: 2 %
ERYTHROCYTE [DISTWIDTH] IN BLOOD BY AUTOMATED COUNT: 12.6 % (ref 10–15)
GFR SERPL CREATININE-BSD FRML MDRD: >90 ML/MIN/1.73M2
GLUCOSE SERPL-MCNC: 100 MG/DL (ref 70–99)
HCT VFR BLD AUTO: 47.8 % (ref 40–53)
HGB BLD-MCNC: 16.2 G/DL (ref 13.3–17.7)
HOLD SPECIMEN: NORMAL
HOLD SPECIMEN: NORMAL
IMM GRANULOCYTES # BLD: 0 10E3/UL
IMM GRANULOCYTES NFR BLD: 0 %
LYMPHOCYTES # BLD AUTO: 1.7 10E3/UL (ref 0.8–5.3)
LYMPHOCYTES NFR BLD AUTO: 28 %
MCH RBC QN AUTO: 30.5 PG (ref 26.5–33)
MCHC RBC AUTO-ENTMCNC: 33.9 G/DL (ref 31.5–36.5)
MCV RBC AUTO: 90 FL (ref 78–100)
MONOCYTES # BLD AUTO: 0.7 10E3/UL (ref 0–1.3)
MONOCYTES NFR BLD AUTO: 11 %
NEUTROPHILS # BLD AUTO: 3.5 10E3/UL (ref 1.6–8.3)
NEUTROPHILS NFR BLD AUTO: 58 %
NRBC # BLD AUTO: 0 10E3/UL
NRBC BLD AUTO-RTO: 0 /100
PLATELET # BLD AUTO: 245 10E3/UL (ref 150–450)
POTASSIUM SERPL-SCNC: 4.4 MMOL/L (ref 3.4–5.3)
RBC # BLD AUTO: 5.32 10E6/UL (ref 4.4–5.9)
SODIUM SERPL-SCNC: 140 MMOL/L (ref 136–145)
TROPONIN T SERPL HS-MCNC: <6 NG/L
WBC # BLD AUTO: 6 10E3/UL (ref 4–11)

## 2023-03-24 PROCEDURE — 258N000003 HC RX IP 258 OP 636: Performed by: PHYSICIAN ASSISTANT

## 2023-03-24 PROCEDURE — 80048 BASIC METABOLIC PNL TOTAL CA: CPT | Performed by: PHYSICIAN ASSISTANT

## 2023-03-24 PROCEDURE — 93010 ELECTROCARDIOGRAM REPORT: CPT | Performed by: INTERNAL MEDICINE

## 2023-03-24 PROCEDURE — 99284 EMERGENCY DEPT VISIT MOD MDM: CPT | Mod: 25 | Performed by: PHYSICIAN ASSISTANT

## 2023-03-24 PROCEDURE — 84484 ASSAY OF TROPONIN QUANT: CPT | Performed by: PHYSICIAN ASSISTANT

## 2023-03-24 PROCEDURE — 99283 EMERGENCY DEPT VISIT LOW MDM: CPT | Performed by: PHYSICIAN ASSISTANT

## 2023-03-24 PROCEDURE — 93005 ELECTROCARDIOGRAM TRACING: CPT | Performed by: PHYSICIAN ASSISTANT

## 2023-03-24 PROCEDURE — 36415 COLL VENOUS BLD VENIPUNCTURE: CPT | Performed by: PHYSICIAN ASSISTANT

## 2023-03-24 PROCEDURE — 99284 EMERGENCY DEPT VISIT MOD MDM: CPT | Performed by: PHYSICIAN ASSISTANT

## 2023-03-24 PROCEDURE — 96360 HYDRATION IV INFUSION INIT: CPT | Performed by: PHYSICIAN ASSISTANT

## 2023-03-24 PROCEDURE — 85025 COMPLETE CBC W/AUTO DIFF WBC: CPT | Performed by: PHYSICIAN ASSISTANT

## 2023-03-24 RX ADMIN — SODIUM CHLORIDE 1000 ML: 9 INJECTION, SOLUTION INTRAVENOUS at 22:16

## 2023-03-24 ASSESSMENT — ACTIVITIES OF DAILY LIVING (ADL)
ADLS_ACUITY_SCORE: 33
ADLS_ACUITY_SCORE: 37

## 2023-03-24 NOTE — LETTER
March 24, 2023      To Whom It May Concern:      Ariel Triplett was seen in our Emergency Department today, 03/24/23. He may return to work when improved.    Sincerely,        ASHELY Pineda

## 2023-03-25 ASSESSMENT — ENCOUNTER SYMPTOMS
LIGHT-HEADEDNESS: 1
ABDOMINAL PAIN: 0
NAUSEA: 0
CONFUSION: 0
SHORTNESS OF BREATH: 0
FEVER: 0
DYSURIA: 0
VOMITING: 0
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
## 2023-03-25 NOTE — DISCHARGE INSTRUCTIONS
Get plenty of fluids and rest.  As discussed, your vital signs, lab work and physical exam appear very well today.  Is difficult to say what is causing your symptoms.  Possible vasovagal reaction after getting out of the shower, see attached information.  Either way, currently.  Be low risk for syncopal event.  If you have recurrent symptoms I recommend he return for further evaluation otherwise you should call and follow-up with PCP for reassessment.

## 2023-03-25 NOTE — ED TRIAGE NOTES
"Pt states he got out of the shower sometime after 1830 tonight and reports \"passing out\" while getting dressed.  Pt states he got up around 1930-nga when his boss called him.  Pt states this happened \"a long time ago\".  Pt states he lives with a couple roommates but they were not home.  Pt's roommate brought him here.  Pt states he feels dizzy and disorientated.  Pt reports a bruise on his leg, but no obvious signs of bleeding.  Pt works nights at [employer] and states he woke up prior to taking a shower.       Triage Assessment     Row Name 03/24/23 2048       Triage Assessment (Adult)    Airway WDL WDL       Respiratory WDL    Respiratory WDL WDL       Skin Circulation/Temperature WDL    Skin Circulation/Temperature WDL WDL       Cognitive/Neuro/Behavioral WDL    Cognitive/Neuro/Behavioral WDL WDL              "

## 2023-03-25 NOTE — TELEPHONE ENCOUNTER
"Pt reports \"sort of collapsed and blacked out after getting out of shower, bruise on leg, other than that didn't hit anything\". Episode occurred 15 minutes. Pt thinks he may be too weak to stand up. Pt reports his roommate will be home in three minutes and will be able to let paramedics in.    Advised pt to hang up and dial 911now per protocol.    Pt verbalizes understanding and agrees to plan.         Reason for Disposition    Shock suspected (e.g., cold/pale/clammy skin, too weak to stand, low BP, rapid pulse)    Additional Information    Negative: Still unconscious    Negative: Difficult to awaken or acting confused (e.g., disoriented, slurred speech)    Protocols used: [id number]      "

## 2023-03-27 LAB
ATRIAL RATE - MUSE: 58 BPM
DIASTOLIC BLOOD PRESSURE - MUSE: NORMAL MMHG
INTERPRETATION ECG - MUSE: NORMAL
P AXIS - MUSE: 58 DEGREES
PR INTERVAL - MUSE: 168 MS
QRS DURATION - MUSE: 100 MS
QT - MUSE: 384 MS
QTC - MUSE: 376 MS
R AXIS - MUSE: 106 DEGREES
SYSTOLIC BLOOD PRESSURE - MUSE: NORMAL MMHG
T AXIS - MUSE: 60 DEGREES
VENTRICULAR RATE- MUSE: 58 BPM

## 2023-03-29 ENCOUNTER — HOSPITAL ENCOUNTER (EMERGENCY)
Facility: OTHER | Age: 28
Discharge: HOME | End: 2023-03-29
Attending: STUDENT IN AN ORGANIZED HEALTH CARE EDUCATION/TRAINING PROGRAM | Admitting: STUDENT IN AN ORGANIZED HEALTH CARE EDUCATION/TRAINING PROGRAM
Payer: COMMERCIAL

## 2023-03-29 VITALS
DIASTOLIC BLOOD PRESSURE: 60 MMHG | TEMPERATURE: 97.7 F | OXYGEN SATURATION: 97 % | SYSTOLIC BLOOD PRESSURE: 100 MMHG | HEART RATE: 62 BPM | WEIGHT: 155 LBS | RESPIRATION RATE: 18 BRPM | BODY MASS INDEX: 24.28 KG/M2

## 2023-03-29 DIAGNOSIS — S29.012A UPPER BACK STRAIN, INITIAL ENCOUNTER: ICD-10-CM

## 2023-03-29 PROCEDURE — 20552 NJX 1/MLT TRIGGER POINT 1/2: CPT | Performed by: STUDENT IN AN ORGANIZED HEALTH CARE EDUCATION/TRAINING PROGRAM

## 2023-03-29 PROCEDURE — 99282 EMERGENCY DEPT VISIT SF MDM: CPT | Performed by: STUDENT IN AN ORGANIZED HEALTH CARE EDUCATION/TRAINING PROGRAM

## 2023-03-29 PROCEDURE — 99282 EMERGENCY DEPT VISIT SF MDM: CPT | Mod: 25 | Performed by: STUDENT IN AN ORGANIZED HEALTH CARE EDUCATION/TRAINING PROGRAM

## 2023-03-29 PROCEDURE — 99207 PR NO CHARGE LOS: CPT | Performed by: STUDENT IN AN ORGANIZED HEALTH CARE EDUCATION/TRAINING PROGRAM

## 2023-03-29 NOTE — Clinical Note
Ariel Uziel was seen and treated in our emergency department on 3/29/2023.  He may return to work on 03/30/2023.       If you have any questions or concerns, please don't hesitate to call.      Ariel Ghosh MD
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
## 2023-03-30 NOTE — ED TRIAGE NOTES
"ED Nursing Triage Note (General)   ________________________________    Ariel GASPER Triplett is a 27 year old Male that presents to triage via private vehicle with complaints of R) shoulder pain.  Patient states he was seen previously in the ER for syncopal episode on Friday and states since then he has continued to have shoulder pain.  Patient states, \"it feels like someone keeps stabbing me in the shoulder.  It's just been getting worse and worse\".  Patient states pain, \"is at a constant 2 but if I hit just the right spot its an 8\". CMS intact on arrival. Patient states he has not taken any tylenol or ibuprofen due to not knowing why his shoulder hurt.   Significant symptoms had onset 5 days ago.  Patient appears alert behavior.  GCS-15  Airway: intact  Breathing noted as Normal  Action taken:4      PRE HOSPITAL PRIOR LIVING SITUATION-home      "

## 2023-05-26 ENCOUNTER — OFFICE VISIT (OUTPATIENT)
Dept: FAMILY MEDICINE | Facility: OTHER | Age: 28
End: 2023-05-26
Payer: COMMERCIAL

## 2023-05-26 VITALS
OXYGEN SATURATION: 96 % | WEIGHT: 150.3 LBS | HEART RATE: 70 BPM | BODY MASS INDEX: 22.26 KG/M2 | HEIGHT: 69 IN | RESPIRATION RATE: 18 BRPM | SYSTOLIC BLOOD PRESSURE: 106 MMHG | DIASTOLIC BLOOD PRESSURE: 54 MMHG | TEMPERATURE: 96.5 F

## 2023-05-26 DIAGNOSIS — J06.9 VIRAL URI WITH COUGH: Primary | ICD-10-CM

## 2023-05-26 LAB
FLUAV RNA SPEC QL NAA+PROBE: NEGATIVE
FLUBV RNA RESP QL NAA+PROBE: NEGATIVE
RSV RNA SPEC NAA+PROBE: NEGATIVE
SARS-COV-2 RNA RESP QL NAA+PROBE: NEGATIVE

## 2023-05-26 PROCEDURE — C9803 HOPD COVID-19 SPEC COLLECT: HCPCS | Performed by: NURSE PRACTITIONER

## 2023-05-26 PROCEDURE — G0463 HOSPITAL OUTPT CLINIC VISIT: HCPCS

## 2023-05-26 PROCEDURE — 99213 OFFICE O/P EST LOW 20 MIN: CPT | Performed by: NURSE PRACTITIONER

## 2023-05-26 PROCEDURE — 87637 SARSCOV2&INF A&B&RSV AMP PRB: CPT | Mod: ZL | Performed by: NURSE PRACTITIONER

## 2023-05-26 ASSESSMENT — PAIN SCALES - GENERAL: PAINLEVEL: MILD PAIN (3)

## 2023-05-26 NOTE — LETTER
May 26, 2023      Ariel Triplett  [address] [place] [id number]        To Whom It May Concern:    Ariel Triplett was seen in our clinic today on 5/26/2023. He may return to work without restrictions when symptoms are improving.      Sincerely,        Penny Vasquez NP
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
## 2023-05-27 NOTE — NURSING NOTE
Pt presents to clinic today for cough, sore throat, and emesis x3 days.      FOOD SECURITY SCREENING QUESTIONS:    The next two questions are to help us understand your food security.  If you are feeling you need any assistance in this area, we have resources available to support you today.    Hunger Vital Signs:  Within the past 12 months we worried whether our food would run out before we got money to buy more. Never  Within the past 12 months the food we bought just didn't last and we didn't have money to get more. Never      Medication Reconciliation: complete  Frances Chisholm LPN,LPN on 5/26/2023 at 8:04 PM

## 2023-09-26 ENCOUNTER — ALLIED HEALTH/NURSE VISIT (OUTPATIENT)
Dept: FAMILY MEDICINE | Facility: OTHER | Age: 28
End: 2023-09-26
Attending: FAMILY MEDICINE
Payer: COMMERCIAL

## 2023-09-26 DIAGNOSIS — Z48.02 ENCOUNTER FOR REMOVAL OF SUTURES: Primary | ICD-10-CM

## 2023-09-26 NOTE — PROGRESS NOTES
Ariel Triplett presents to the clinic today for removal of sutures.  The patient has had the sutures in place for 10 days. There has been no history of infection or drainage.  6 sutures are seen located on the left thigh. The wound is healing well with no signs of infection. All sutures were easily removed today. Three steri strips applied to site. Patient instructed to let them fall of on their own. Routine wound care discussed. The patient has a follow up appointment scheduled with Melissa Bond PA-C on Monday, 10/2 to discuss patient's questions and reassess the site.     LEXII CARRILLO RN on 9/26/2023 at 3:42 PM

## 2023-10-02 ENCOUNTER — OFFICE VISIT (OUTPATIENT)
Dept: FAMILY MEDICINE | Facility: OTHER | Age: 28
End: 2023-10-02
Attending: PHYSICIAN ASSISTANT
Payer: COMMERCIAL

## 2023-10-02 VITALS
HEIGHT: 69 IN | WEIGHT: 153 LBS | BODY MASS INDEX: 22.66 KG/M2 | OXYGEN SATURATION: 97 % | DIASTOLIC BLOOD PRESSURE: 70 MMHG | RESPIRATION RATE: 16 BRPM | SYSTOLIC BLOOD PRESSURE: 112 MMHG | HEART RATE: 73 BPM | TEMPERATURE: 97.7 F

## 2023-10-02 DIAGNOSIS — S79.922A INJURY OF LEFT THIGH, INITIAL ENCOUNTER: Primary | ICD-10-CM

## 2023-10-02 DIAGNOSIS — S71.112D LACERATION OF LEFT THIGH, SUBSEQUENT ENCOUNTER: ICD-10-CM

## 2023-10-02 PROCEDURE — G0463 HOSPITAL OUTPT CLINIC VISIT: HCPCS

## 2023-10-02 PROCEDURE — 99213 OFFICE O/P EST LOW 20 MIN: CPT | Performed by: PHYSICIAN ASSISTANT

## 2023-10-02 RX ORDER — SULFAMETHOXAZOLE/TRIMETHOPRIM 800-160 MG
1 TABLET ORAL 2 TIMES DAILY
Qty: 14 TABLET | Refills: 0 | Status: SHIPPED | OUTPATIENT
Start: 2023-10-02 | End: 2023-10-09

## 2023-10-02 ASSESSMENT — PAIN SCALES - GENERAL: PAINLEVEL: MODERATE PAIN (5)

## 2023-10-02 NOTE — PATIENT INSTRUCTIONS
MRI of femur/thigh ordered, you will be contacted to schedule     Continue with Vasoline or antibiotic ointment. Vitamin E oil/lotion for scar.     Watch for fevers, chills, worsening redness with drainage (pussing).     You were prescribed an antibiotic, please take into consideration the following information:  - Take entire course of antibiotic even if you start to feel better.  - Antibiotics can cause stomach upset including nausea and diarrhea. Read your bottle or ask the pharmacist if antibiotic can be taken with food to help prevent nausea. If you have symptoms of diarrhea you can take an over-the-counter probiotic and/or increase foods with probiotics such as yogurt, [place], sauerkraut.  -Use caution in sunlight as can lead to increased risk of sunburn while on ABX (antibiotics).     Please refer to your AVS for follow up and pain/symptoms management recommendations (I.e.: medications, helpful conservative treatment modalities, appropriate follow up if need to a specialist or family practice, etc.). Please return to urgent care if your symptoms change or worsen.     Discharge instructions:  -If you were prescribed a medication(s), please take this as prescribed/directed  -Monitor your symptoms, if changing/worsening, return to UC/ER or PCP for follow up    Cellulitis/Skin Infection   -If you received a prescription for a topical or oral antibiotic, please take/use as directed.   -Keep the area clean and dry.   -If needed - for pain control - we recommend alternating Tylenol and Ibuprofen if you are able to take these medications. Alternate every 4 hours as needed. I.e.: Ibuprofen at 8am, Tylenol 12pm, Ibuprofen 4pm    -Daily maximum of Tylenol is 4000mg (recommend staying under 3000mg)   -Daily maximum of Ibuprofen is 3200 mg    Watch for worsening symptoms such as fevers, chills, worsening redness, abnormal drainage from site of cellulitis, etc.
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
## 2023-10-02 NOTE — NURSING NOTE
Pt presents to clinic today for a follow up of upper left leg injury.  Has had stitches removed. Still in a lot of pain, hurts to walk.       FOOD SECURITY SCREENING QUESTIONS:    The next two questions are to help us understand your food security.  If you are feeling you need any assistance in this area, we have resources available to support you today.    Hunger Vital Signs:  Within the past 12 months we worried whether our food would run out before we got money to buy more. Never  Within the past 12 months the food we bought just didn't last and we didn't have money to get more. Never        Advance care directive on file? no  Advance care directive provided to patient? no     Medication Reconciliation: complete  Lawrence Leahy LPN,LPN on 10/2/2023 at 10:50 AM

## 2023-10-02 NOTE — COMMUNITY RESOURCES LIST (ENGLISH)
10/02/2023   [facility]  N/A  For questions about this resource list or additional care needs, please contact your primary care clinic or care manager.  Phone: [phone]   Email: N/A   Address: [address] [place] [id number]   Hours: N/A        Food and Nutrition       Food pantry  1  [facility] Distance: 1.54 miles      In-Person   [address] [place] [address]  Language: English  Hours: Mon - Thu 11:00 AM - 3:30 PM  Fees: Free   Phone: (513) 390-7939 Email: info@[facility] Website: https://[facility]     2  [facility] Distance: 14.88 miles      Pick   [address] [place] [address]  Language: English  Hours: Thu 10:00 AM - 1:00 PM  Fees: Free   Phone: (810) 414-5856 Email: tracey@[employer] Website: https://www.[employer].org.com/[place]/     SNAP application assistance  3  [facility] Distance: 0.67 miles      [address] [place] [address] [place] [address]  Language: English  Hours: Mon - Fri 8:00 AM - 4:30 PM  Fees: Free   Phone: (250) 603-6856 Website: https://www.hospitals./[facility]/Health-Human-Services     4  [facility] Distance: 0.68 miles      In-Person, Phone/Virtual   [address] [facility] [place] [address]  Language: English  Hours: Mon 8:00 AM - 4:30 PM Appt. Only, Tue - Thu 8:00 AM - 4:30 PM , Fri 8:00 AM - 4:30 PM Appt. Only  Fees: Free   Phone: (667) 207-2247 Website: http://www.[employer]          Transportation       Free or low-cost transportation  5  [facility] Distance: 0.68 miles      In-Person   [address] [place] [address] [place] [address]  Language: English  Hours: Mon 8:00 AM - 4:30 PM Appt. Only, Tue - Thu 8:00 AM - 4:30 PM , Fri 8:00 AM - 4:30 PM Appt. Only  Fees: Free   Phone: (312) 399-4834  Website: http://www.[employer].org          Important Numbers & Websites       Emergency Services   911  [facility] Services   311  Poison Control   (266) 270-1578  Suicide Prevention Lifeline   (676) 437-3313 (TALK)  Child Abuse Hotline   (414) 487-4116 (4-A-Child)  Sexual Assault Hotline   (422) 180-5867 (HOPE)  National Runaway Safeline   (777) 752-8611 (RUNAWAY)  All-Options Talkline   (520) 208-6985  Substance Abuse Referral   (707) 443-8858 (HELP)

## 2023-10-17 ENCOUNTER — HOSPITAL ENCOUNTER (OUTPATIENT)
Dept: MRI IMAGING | Facility: OTHER | Age: 28
Discharge: HOME | End: 2023-10-17
Attending: PHYSICIAN ASSISTANT | Admitting: PHYSICIAN ASSISTANT
Payer: COMMERCIAL

## 2023-10-17 DIAGNOSIS — S79.922A INJURY OF LEFT THIGH, INITIAL ENCOUNTER: ICD-10-CM

## 2023-10-17 PROCEDURE — 73718 MRI LOWER EXTREMITY W/O DYE: CPT | Mod: LT

## 2024-02-24 ENCOUNTER — HEALTH MAINTENANCE LETTER (OUTPATIENT)
Age: 29
End: 2024-02-24

## 2025-03-09 ENCOUNTER — HEALTH MAINTENANCE LETTER (OUTPATIENT)
Age: 30
End: 2025-03-09

## (undated) DEVICE — STRAP KNEE/BODY 31143004

## (undated) DEVICE — DRSG AQUACEL AG 3.5X6.0" HYDROFIBER 412010

## (undated) DEVICE — PREP DURAPREP 26ML APL 8630

## (undated) DEVICE — DRSG STERI STRIP 1/2X4" R1547

## (undated) DEVICE — SPONGE SURGIFOAM 12 1972

## (undated) DEVICE — SUCTION MANIFOLD DORNOCH ULTRA CART UL-CL500

## (undated) DEVICE — SU MONOCRYL 4-0 PS-2 18" UND Y496G

## (undated) DEVICE — GLOVE PROTEXIS W/NEU-THERA 7.0  2D73TE70

## (undated) DEVICE — VIAL CULTURE ANAEROBIC PORT-A-CUL

## (undated) DEVICE — DECANTER TRANSFER DEVICE 2008S

## (undated) DEVICE — LINEN ORTHO PACK 5446

## (undated) DEVICE — SOL WATER IRRIG 1000ML BOTTLE 2F7114

## (undated) DEVICE — DRAPE STOCKINETTE IMPERVIOUS 12" 1587

## (undated) DEVICE — SU VICRYL 2-0 CT-2 27" UND J269H

## (undated) DEVICE — PACK LOWER EXTREMITY RIVERSIDE SOP32LEFSX

## (undated) DEVICE — PREP SKIN SCRUB TRAY 4461A

## (undated) DEVICE — LINEN GOWN X4 5410

## (undated) DEVICE — PREP POVIDONE IODINE SCRUB 7.5% 120ML

## (undated) DEVICE — DRSG TELFA 3X8" 1238

## (undated) DEVICE — SU VICRYL 0 CT-2 27" J334H

## (undated) DEVICE — SOL NACL 0.9% IRRIG 1000ML BOTTLE 2F7124

## (undated) DEVICE — ESU GROUND PAD UNIVERSAL W/O CORD

## (undated) DEVICE — ESU PENCIL W/SMOKE EVAC NEPTUNE STRYKER 0703-046-000

## (undated) DEVICE — DRAPE IOBAN INCISE 23X17" 6650EZ

## (undated) DEVICE — BONE WAX OSTENE 1.0GM BW-05

## (undated) DEVICE — APPLICATORS COTTON-TIPPED 3" PKG OF 2 C15050-003

## (undated) DEVICE — STRAP STIRRUP W/SLIP 30187-030

## (undated) DEVICE — SPECIMEN CONTAINER 5OZ STERILE 2600SA

## (undated) DEVICE — Device

## (undated) DEVICE — DRAPE U-DRAPE 1015NSD NON-STERILE

## (undated) RX ORDER — GABAPENTIN 100 MG/1
CAPSULE ORAL
Status: DISPENSED
Start: 2017-08-28

## (undated) RX ORDER — IBUPROFEN 200 MG
TABLET ORAL
Status: DISPENSED
Start: 2022-12-05

## (undated) RX ORDER — CEFAZOLIN SODIUM 1 G/3ML
INJECTION, POWDER, FOR SOLUTION INTRAMUSCULAR; INTRAVENOUS
Status: DISPENSED
Start: 2017-08-28

## (undated) RX ORDER — FENTANYL CITRATE 50 UG/ML
INJECTION, SOLUTION INTRAMUSCULAR; INTRAVENOUS
Status: DISPENSED
Start: 2017-08-28

## (undated) RX ORDER — OXYCODONE HYDROCHLORIDE 5 MG/1
TABLET ORAL
Status: DISPENSED
Start: 2017-08-28

## (undated) RX ORDER — PROPOFOL 10 MG/ML
INJECTION, EMULSION INTRAVENOUS
Status: DISPENSED
Start: 2017-08-28

## (undated) RX ORDER — LIDOCAINE HYDROCHLORIDE 10 MG/ML
INJECTION, SOLUTION INFILTRATION; PERINEURAL
Status: DISPENSED
Start: 2023-03-29

## (undated) RX ORDER — LORAZEPAM 2 MG/ML
INJECTION INTRAMUSCULAR
Status: DISPENSED
Start: 2023-03-07

## (undated) RX ORDER — CEFTRIAXONE SODIUM 1 G
VIAL (EA) INJECTION
Status: DISPENSED
Start: 2021-08-18

## (undated) RX ORDER — LIDOCAINE HYDROCHLORIDE 20 MG/ML
SOLUTION OROPHARYNGEAL
Status: DISPENSED
Start: 2020-08-29

## (undated) RX ORDER — NEOMYCIN/BACITRACIN/POLYMYXINB 3.5-400-5K
OINTMENT (GRAM) TOPICAL
Status: DISPENSED
Start: 2023-03-14

## (undated) RX ORDER — ACETAMINOPHEN 325 MG/1
TABLET ORAL
Status: DISPENSED
Start: 2017-08-28

## (undated) RX ORDER — LIDOCAINE HYDROCHLORIDE 10 MG/ML
INJECTION, SOLUTION EPIDURAL; INFILTRATION; INTRACAUDAL; PERINEURAL
Status: DISPENSED
Start: 2021-08-18

## (undated) RX ORDER — BUPIVACAINE HYDROCHLORIDE 2.5 MG/ML
INJECTION, SOLUTION EPIDURAL; INFILTRATION; INTRACAUDAL
Status: DISPENSED
Start: 2017-08-28

## (undated) RX ORDER — ONDANSETRON 2 MG/ML
INJECTION INTRAMUSCULAR; INTRAVENOUS
Status: DISPENSED
Start: 2017-08-28

## (undated) RX ORDER — IBUPROFEN 400 MG/1
TABLET, FILM COATED ORAL
Status: DISPENSED
Start: 2022-12-05

## (undated) RX ORDER — CYCLOBENZAPRINE HCL 10 MG
TABLET ORAL
Status: DISPENSED
Start: 2021-06-01

## (undated) RX ORDER — HYDROXYZINE HYDROCHLORIDE 25 MG/1
TABLET, FILM COATED ORAL
Status: DISPENSED
Start: 2017-08-28

## (undated) RX ORDER — KETOROLAC TROMETHAMINE 30 MG/ML
INJECTION, SOLUTION INTRAMUSCULAR; INTRAVENOUS
Status: DISPENSED
Start: 2021-06-01

## (undated) RX ORDER — KETOROLAC TROMETHAMINE 30 MG/ML
INJECTION, SOLUTION INTRAMUSCULAR; INTRAVENOUS
Status: DISPENSED
Start: 2021-08-18